# Patient Record
Sex: FEMALE | Race: WHITE | NOT HISPANIC OR LATINO | Employment: FULL TIME | URBAN - METROPOLITAN AREA
[De-identification: names, ages, dates, MRNs, and addresses within clinical notes are randomized per-mention and may not be internally consistent; named-entity substitution may affect disease eponyms.]

---
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
## 2018-05-14 ENCOUNTER — OFFICE VISIT (OUTPATIENT)
Dept: FAMILY MEDICINE | Facility: CLINIC | Age: 18
End: 2018-05-14
Payer: COMMERCIAL

## 2018-05-14 VITALS
WEIGHT: 181 LBS | BODY MASS INDEX: 25.97 KG/M2 | TEMPERATURE: 97.7 F | RESPIRATION RATE: 12 BRPM | HEART RATE: 65 BPM | OXYGEN SATURATION: 100 % | DIASTOLIC BLOOD PRESSURE: 78 MMHG | SYSTOLIC BLOOD PRESSURE: 124 MMHG

## 2018-05-14 DIAGNOSIS — Z30.9 ENCOUNTER FOR CONTRACEPTIVE MANAGEMENT, UNSPECIFIED TYPE: Primary | ICD-10-CM

## 2018-05-14 DIAGNOSIS — N92.0 EXCESSIVE OR FREQUENT MENSTRUATION: ICD-10-CM

## 2018-05-14 PROCEDURE — 99213 OFFICE O/P EST LOW 20 MIN: CPT | Performed by: FAMILY MEDICINE

## 2018-05-14 RX ORDER — DESOGESTREL AND ETHINYL ESTRADIOL 0.15-0.03
1 KIT ORAL DAILY
Qty: 84 TABLET | Refills: 0 | Status: SHIPPED | OUTPATIENT
Start: 2018-05-14 | End: 2018-08-13 | Stop reason: ALTCHOICE

## 2018-05-14 RX ORDER — DROSPIRENONE AND ETHINYL ESTRADIOL 0.02-3(28)
1 KIT ORAL DAILY
COMMUNITY
End: 2018-05-14 | Stop reason: ALTCHOICE

## 2018-05-14 NOTE — PROGRESS NOTES
SUBJECTIVE:   Tayler Andrew is a 17 year old female who presents to clinic today for the following health issues:      Contraception-wants to switch to something different, heavy menses and cramping     Patient here today to discuss current birth control.  Was started on Antionette in August 2017 and didn't notice any difference with flow of periods. States she went on this because of her heavy periods.   LMP- 5/11/18. Uses super pluses through out the period and has to change after about an hour and half.   Periods usually last about 4-5 days with some cramping but not severe.  Almost done with current pack of Antionette- on placebo.             Problem list and histories reviewed & adjusted, as indicated.  Additional history: as documented    Patient Active Problem List   Diagnosis     Excessive or frequent menstruation     History reviewed. No pertinent surgical history.    Social History   Substance Use Topics     Smoking status: Never Smoker     Smokeless tobacco: Never Used     Alcohol use No     History reviewed. No pertinent family history.        Reviewed and updated as needed this visit by clinical staff  Tobacco  Allergies  Meds  Med Hx  Surg Hx  Fam Hx  Soc Hx      Reviewed and updated as needed this visit by Provider         ROS:  Constitutional, HEENT, cardiovascular, pulmonary, GI, , musculoskeletal, neuro, skin, endocrine and psych systems are negative, except as otherwise noted.    OBJECTIVE:     /78 (BP Location: Right arm, Patient Position: Chair, Cuff Size: Adult Regular)  Pulse 65  Temp 97.7  F (36.5  C) (Oral)  Resp 12  Wt 181 lb (82.1 kg)  SpO2 100%  BMI 25.97 kg/m2  Body mass index is 25.97 kg/(m^2).  GENERAL: healthy, alert and no distress  RESP: lungs clear to auscultation - no rales, rhonchi or wheezes  CV: regular rate and rhythm, normal S1 S2  ABDOMEN: soft, nontender, no hepatosplenomegaly, no masses and bowel sounds normal    Diagnostic Test Results:  none      ASSESSMENT/PLAN:         1. Encounter for contraceptive management, unspecified type  - discussed other options. She wants to stick with OCP for now. will switch from XANDER to Apri and have patient follow up in 3 months prior to returning to school   - desogestrel-ethinyl estradiol (APRI) 0.15-30 MG-MCG per tablet; Take 1 tablet by mouth daily  Dispense: 84 tablet; Refill: 0    2. Excessive or frequent menstruation  - as above   - desogestrel-ethinyl estradiol (APRI) 0.15-30 MG-MCG per tablet; Take 1 tablet by mouth daily  Dispense: 84 tablet; Refill: 0    See Patient Instructions    Sherry Wilburn MD  [facility]

## 2018-05-14 NOTE — MR AVS SNAPSHOT
"              After Visit Summary   5/14/2018    Tayler Andrew    MRN: [id number]           Patient Information     Date Of Birth          2000        Visit Information        Provider Department      5/14/2018 10:30 AM Sherry Wilburn MD [facility]        Today's Diagnoses     Encounter for contraceptive management, unspecified type    -  1      Care Instructions    Follow up in 3 months or sooner if needed          Follow-ups after your visit        Follow-up notes from your care team     Return in about 3 months (around 8/14/2018).      Who to contact     If you have questions or need follow up information about today's clinic visit or your schedule please contact [facility] directly at [phone].  Normal or non-critical lab and imaging results will be communicated to you by MyChart, letter or phone within 4 business days after the clinic has received the results. If you do not hear from us within 7 days, please contact the clinic through MyChart or phone. If you have a critical or abnormal lab result, we will notify you by phone as soon as possible.  Submit refill requests through [employer] or call your pharmacy and they will forward the refill request to us. Please allow 3 business days for your refill to be completed.          Additional Information About Your Visit        MyCharTradeBriefs Information     [employer] lets you send messages to your doctor, view your test results, renew your prescriptions, schedule appointments and more. To sign up, go to [email or web address] . Click on \"Log in\" on the left side of the screen, which will take you to the Welcome page. Then click on \"Sign up Now\" on the right side of the page.     You will be asked to enter the access code listed below, as well as some personal information. Please follow the directions to create your username and password.     Your access code is: [email or web address]  Expires: 8/12/2018 10:52 AM   "   Your access code will  in 90 days. If you need help or a new code, please call your [facility] or [phone].        Care EveryWhere ID     This is your Care EveryWhere ID. This could be used by other organizations to access your [facility] medical records  [id number]        Your Vitals Were     Pulse Temperature Respirations Pulse Oximetry BMI (Body Mass Index)       65 97.7  F (36.5  C) (Oral) 12 100% 25.97 kg/m2        Blood Pressure from Last 3 Encounters:   18 124/78   10/11/17 110/66   17 97/64    Weight from Last 3 Encounters:   18 181 lb (82.1 kg) (95 %)*   10/11/17 177 lb (80.3 kg) (94 %)*   17 166 lb (75.3 kg) (92 %)*     * Growth percentiles are based on [facility] 2-20 Years data.              Today, you had the following     No orders found for display         Today's Medication Changes          These changes are accurate as of 18 10:53 AM.  If you have any questions, ask your nurse or doctor.               Start taking these medicines.        Dose/Directions    desogestrel-ethinyl estradiol 0.15-30 MG-MCG per tablet   Commonly known as:  APRI   Used for:  Encounter for contraceptive management, unspecified type   Started by:  Sherry Wilburn MD        Dose:  1 tablet   Take 1 tablet by mouth daily   Quantity:  84 tablet   Refills:  0            Where to get your medicines      These medications were sent to [facility]/pharmacy #[id number] - [place], [place] - [address] [address]  [address] [address] [address]    Hours:  [facility] Phone:  [phone]     desogestrel-ethinyl estradiol 0.15-30 MG-MCG per tablet                Primary Care Provider Office Phone # Fax #    Suzi Franks [phone]       [facility] [address]  [facility] [address]        Equal Access to Services     DAE ALEMAN AH: Jennyfer Cervantes, waaxda luqadaha, qaybta kaalmada mar boogie  ah. So [facility] [phone].    ATENCIÓN: Si adam rosado, tiene a gaming disposición servicios gratuitos de asistencia lingüística. Gilda al [phone].    We comply with applicable federal civil rights laws and [place] laws. We do not discriminate on the basis of race, color, national origin, age, disability, sex, sexual orientation, or gender identity.            Thank you!     Thank you for choosing [facility]  for your care. Our goal is always to provide you with excellent care. Hearing back from our patients is one way we can continue to improve our services. Please take a few minutes to complete the written survey that you may receive in the mail after your visit with us. Thank you!             Your Updated Medication List - Protect others around you: Learn how to safely use, store and throw away your medicines at www.disposemymeds.org.          This list is accurate as of 5/14/18 10:53 AM.  Always use your most recent med list.                   Brand Name Dispense Instructions for use Diagnosis    desogestrel-ethinyl estradiol 0.15-30 MG-MCG per tablet    APRI    84 tablet    Take 1 tablet by mouth daily    Encounter for contraceptive management, unspecified type       drospirenone-ethinyl estradiol 3-0.02 MG per tablet    XANDER     Take 1 tablet by mouth daily

## 2018-07-20 ENCOUNTER — TELEPHONE (OUTPATIENT)
Dept: FAMILY MEDICINE | Facility: CLINIC | Age: 18
End: 2018-07-20

## 2018-07-20 NOTE — TELEPHONE ENCOUNTER
Called Patient LMOVM, advised she make an appointment with Dr. Leyla Leal for medication follow-up per her last office visit note. Patient is due for office visit August 14, 2018. She can make an appointment sooner than August 14th, 2018.     Rosemarie Herrera RN  Patient Care Supervisor  [facility]  [phone]

## 2018-07-20 NOTE — TELEPHONE ENCOUNTER
Patient states the desogestrel-ethinyl estradiol (APRI) 0.15-30 MG-MCG  Is not helping her.  Continues to have heavy periods.  Would like to consider changing medication.  Please call to discuss.

## 2018-08-13 ENCOUNTER — OFFICE VISIT (OUTPATIENT)
Dept: FAMILY MEDICINE | Facility: CLINIC | Age: 18
End: 2018-08-13
Payer: COMMERCIAL

## 2018-08-13 VITALS
TEMPERATURE: 98.2 F | BODY MASS INDEX: 26.46 KG/M2 | RESPIRATION RATE: 16 BRPM | SYSTOLIC BLOOD PRESSURE: 109 MMHG | HEART RATE: 58 BPM | HEIGHT: 71 IN | DIASTOLIC BLOOD PRESSURE: 74 MMHG | WEIGHT: 189 LBS

## 2018-08-13 DIAGNOSIS — Z00.00 ROUTINE GENERAL MEDICAL EXAMINATION AT A HEALTH CARE FACILITY: Primary | ICD-10-CM

## 2018-08-13 DIAGNOSIS — N92.0 EXCESSIVE OR FREQUENT MENSTRUATION: ICD-10-CM

## 2018-08-13 DIAGNOSIS — Z30.41 ENCOUNTER FOR SURVEILLANCE OF CONTRACEPTIVE PILLS: ICD-10-CM

## 2018-08-13 LAB
ERYTHROCYTE [DISTWIDTH] IN BLOOD BY AUTOMATED COUNT: 13.9 % (ref 10–15)
HCT VFR BLD AUTO: 42 % (ref 35–47)
HGB BLD-MCNC: 13.3 G/DL (ref 11.7–15.7)
MCH RBC QN AUTO: 27.8 PG (ref 26.5–33)
MCHC RBC AUTO-ENTMCNC: 31.7 G/DL (ref 31.5–36.5)
MCV RBC AUTO: 88 FL (ref 78–100)
PLATELET # BLD AUTO: 255 10E9/L (ref 150–450)
RBC # BLD AUTO: 4.79 10E12/L (ref 3.8–5.2)
TSH SERPL DL<=0.005 MIU/L-ACNC: 0.71 MU/L (ref 0.4–4)
WBC # BLD AUTO: 5.2 10E9/L (ref 4–11)

## 2018-08-13 PROCEDURE — 85610 PROTHROMBIN TIME: CPT | Performed by: FAMILY MEDICINE

## 2018-08-13 PROCEDURE — 99395 PREV VISIT EST AGE 18-39: CPT | Mod: 25 | Performed by: FAMILY MEDICINE

## 2018-08-13 PROCEDURE — 90620 MENB-4C VACCINE IM: CPT | Performed by: FAMILY MEDICINE

## 2018-08-13 PROCEDURE — 85027 COMPLETE CBC AUTOMATED: CPT | Performed by: FAMILY MEDICINE

## 2018-08-13 PROCEDURE — 99213 OFFICE O/P EST LOW 20 MIN: CPT | Mod: 25 | Performed by: FAMILY MEDICINE

## 2018-08-13 PROCEDURE — 84443 ASSAY THYROID STIM HORMONE: CPT | Performed by: FAMILY MEDICINE

## 2018-08-13 PROCEDURE — 90471 IMMUNIZATION ADMIN: CPT | Performed by: FAMILY MEDICINE

## 2018-08-13 PROCEDURE — 90472 IMMUNIZATION ADMIN EACH ADD: CPT | Performed by: FAMILY MEDICINE

## 2018-08-13 PROCEDURE — 90651 9VHPV VACCINE 2/3 DOSE IM: CPT | Performed by: FAMILY MEDICINE

## 2018-08-13 PROCEDURE — 36415 COLL VENOUS BLD VENIPUNCTURE: CPT | Performed by: FAMILY MEDICINE

## 2018-08-13 PROCEDURE — 87491 CHLMYD TRACH DNA AMP PROBE: CPT | Performed by: FAMILY MEDICINE

## 2018-08-13 RX ORDER — LEVONORGESTREL AND ETHINYL ESTRADIOL 0.15-0.03
1 KIT ORAL DAILY
Qty: 91 TABLET | Refills: 1 | Status: SHIPPED | OUTPATIENT
Start: 2018-08-13 | End: 2018-10-17

## 2018-08-13 NOTE — LETTER
August 15, 2018      Tayler Andrew  [address] [address] [address]        Dear ,    We are writing to inform you of your test results.    All your labs from recent visit are normal.   No real reason for the increased sweating. Likely due to hormones.   Please follow up as discussed.   Good luck with the upcoming school year.     Dr. Palemr     Resulted Orders   Chlamydia trachomatis PCR   Result Value Ref Range    Specimen Description Urine     Chlamydia Trachomatis PCR Negative NEG^Negative      Comment:      Negative for C. trachomatis rRNA by transcription mediated amplification.  A negative result by transcription mediated amplification does not preclude   the presence of C. trachomatis infection because results are dependent on   proper and adequate collection, absence of inhibitors, and sufficient rRNA to   be detected.     TSH with free T4 reflex   Result Value Ref Range    TSH 0.71 0.40 - 4.00 mU/L   CBC with platelets   Result Value Ref Range    WBC 5.2 4.0 - 11.0 10e9/L    RBC Count 4.79 3.8 - 5.2 10e12/L    Hemoglobin 13.3 11.7 - 15.7 g/dL    Hematocrit 42.0 35.0 - 47.0 %    MCV 88 78 - 100 fl    MCH 27.8 26.5 - 33.0 pg    MCHC 31.7 31.5 - 36.5 g/dL    RDW 13.9 10.0 - 15.0 %    Platelet Count 255 150 - 450 10e9/L   INR   Result Value Ref Range    INR 0.93 0.86 - 1.14       If you have any questions or concerns, please call the clinic at the number listed above.       Sincerely,        Sherry Palmer MD/FANNY

## 2018-08-13 NOTE — PROGRESS NOTES
SUBJECTIVE:   CC: Tayler Andrew is an 17 year old woman who presents for preventive health visit.     Physical   Annual:     Getting at least 3 servings of Calcium per day:  Yes    Bi-annual eye exam:  NO    Dental care twice a year:  Yes    Sleep apnea or symptoms of sleep apnea:  None    Diet:  Regular (no restrictions)    Frequency of exercise:  4-5 days/week    Duration of exercise:  45-60 minutes    Taking medications regularly:  Yes    Medication side effects:  Not applicable    Additional concerns today:  No  Contraception       Per patient bleeding has been worse than when on XANDER. Cramping is better but no the bleeding.   LMP- 7/16/18. Usually lasts 4-5 days. Felt like had to change pads every 45 mins to an hour. Also reports more sweating than usual.     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         Today's PHQ-2 Score:   PHQ-2 ( 1999 [employer]) 8/13/2018   Q1: Little interest or pleasure in doing things 0   Q2: Feeling down, depressed or hopeless 0   PHQ-2 Score 0   Q1: Little interest or pleasure in doing things Not at all   Q2: Feeling down, depressed or hopeless Not at all   PHQ-2 Score 0       Abuse: Current or Past(Physical, Sexual or Emotional)- No  Do you feel safe in your environment - Yes    Social History   Substance Use Topics     Smoking status: Never Smoker     Smokeless tobacco: Never Used     Alcohol use No     Alcohol Use 8/13/2018   If you drink alcohol do you typically have greater than 3 drinks per day OR greater than 7 drinks per week? No   No flowsheet data found.    Reviewed orders with patient.  Reviewed health maintenance and updated orders accordingly - Yes  Labs reviewed in EPIC    Mammogram not appropriate for this patient based on age.    Pertinent mammograms are reviewed under the imaging tab.  History of abnormal Pap smear: NO - under age 21, PAP not appropriate for age     Reviewed and updated as needed this visit by clinical  "staff  Tobacco  Allergies  Meds         Reviewed and updated as needed this visit by Provider            Review of Systems  CONSTITUTIONAL: NEGATIVE for fever, chills, change in weight  INTEGUMENTARU/SKIN: NEGATIVE for worrisome rashes, moles or lesions  EYES: NEGATIVE for vision changes or irritation  ENT: NEGATIVE for ear, mouth and throat problems  RESP: NEGATIVE for significant cough or SOB  BREAST: NEGATIVE for masses, tenderness or discharge  CV: NEGATIVE for chest pain, palpitations or peripheral edema  GI: NEGATIVE for nausea, abdominal pain, heartburn, or change in bowel habits   female: menses: menorrhagia   MUSCULOSKELETAL: NEGATIVE for significant arthralgias or myalgia  NEURO: NEGATIVE for weakness, dizziness or paresthesias  ENDOCRINE: increase sweating   PSYCHIATRIC: NEGATIVE for changes in mood or affect     OBJECTIVE:   /74 (BP Location: Left arm, Patient Position: Chair, Cuff Size: Adult Large)  Pulse 58  Temp 98.2  F (36.8  C) (Oral)  Resp 16  Ht 5' 11\" (1.803 m)  Wt 189 lb (85.7 kg)  LMP 07/16/2018  Breastfeeding? No  BMI 26.36 kg/m2  Physical Exam  GENERAL: healthy, alert and no distress  EYES: Eyes grossly normal to inspection, PERRL and conjunctivae and sclerae normal  HENT: ear canals and TM's normal, nose and mouth without ulcers or lesions  NECK: no adenopathy, no asymmetry, masses, or scars and thyroid normal to palpation  RESP: lungs clear to auscultation - no rales, rhonchi or wheezes  CV: regular rate and rhythm, normal S1 S2,  ABDOMEN: soft, nontender,  and bowel sounds normal  MS: no gross musculoskeletal defects noted, no edema  SKIN: no suspicious lesions or rashes  NEURO: Normal strength and tone, mentation intact and speech normal  PSYCH: mentation appears normal, affect normal/bright    Diagnostic Test Results:  none     ASSESSMENT/PLAN:   1. Routine general medical examination at a health care facility  - TSH with free T4 reflex  - CBC with platelets  - " "MENINGOCOCCAL RP W/OMV VACCINE 2 DOSE IM (BEXSERO )  - HC HPV VAC 9V 3 DOSE IM    2. Excessive or frequent menstruation  - options discussed with the way forward including different OCP vs depo vs IUD. We will try on seasonal for now. Patient will follow up in 3 months or sooner if needed   - levonorgestrel-ethinyl estradiol (SEASONALE) 0.15-0.03 MG per tablet; Take 1 tablet by mouth daily  Dispense: 91 tablet; Refill: 1  - TSH with free T4 reflex  - CBC with platelets  - INR    3. Encounter for surveillance of contraceptive pills  - Chlamydia trachomatis PCR    COUNSELING:  Reviewed preventive health counseling, as reflected in patient instructions       Regular exercise       Healthy diet/nutrition       Safe sex practices/STD prevention    BP Readings from Last 1 Encounters:   08/13/18 109/74     Estimated body mass index is 26.36 kg/(m^2) as calculated from the following:    Height as of this encounter: 5' 11\" (1.803 m).    Weight as of this encounter: 189 lb (85.7 kg).      Weight management plan: Discussed healthy diet and exercise guidelines and patient will follow up in 12 months in clinic to re-evaluate.     reports that she has never smoked. She has never used smokeless tobacco.      Counseling Resources:  ATP IV Guidelines  Pooled Cohorts Equation Calculator  Breast Cancer Risk Calculator  FRAX Risk Assessment  ICSI Preventive Guidelines  Dietary Guidelines for Americans, 2010  USDA's MyPlate  ASA Prophylaxis  Lung CA Screening    Sherry Wilburn MD  [facility]  Answers for HPI/ROS submitted by the patient on 8/13/2018   PHQ-2 Score: 0    "

## 2018-08-13 NOTE — MR AVS SNAPSHOT
After Visit Summary   8/13/2018    Tayler Andrew    MRN: [id number]           Patient Information     Date Of Birth          2000        Visit Information        Provider Department      8/13/2018 10:30 AM Sherry Wilburn MD [facility]        Today's Diagnoses     Routine general medical examination at a health care facility    -  1    Excessive or frequent menstruation        Encounter for surveillance of contraceptive pills          Care Instructions      Follow up in 3 months or sooner if needed   Prevention Guidelines, Women Ages 18 to 39  Screening tests and vaccines are an important part of managing your health. A screening test is done to find possible disorders or diseases in people who don't have any symptoms. The goal is to find a disease early so lifestyle changes can be made and you can be watched more closely to reduce the risk of disease, or to detect it early enough to treat it most effectively. Screening tests are not considered diagnostic, but are used to determine if more testing is needed. Health counseling is essential, too. Below are guidelines for these, for women ages 18 to 39. Talk with your healthcare provider to make sure you re up-to-date on what you need.  Screening Who needs it How often   Alcohol misuse All women in this age group At routine exams   Blood pressure All women in this age group Yearly checkup if your blood pressure is normal  Normal blood pressure is less than 120/80 mm Hg  If your blood pressure reading is higher than normal, follow the advice of your healthcare provider   Breast cancer All women in this age group should talk with their healthcare providers about the need for clinical breast exams (CBE)1 Clinical breast exam every 3 years1   Cervical cancer Women ages 21 and older Women between ages 21 and 29 should have a Pap test every 3 years; women between ages 30 and 65 are advised to have a Pap test plus an  HPV test every 5 years   Chlamydia Sexually active women ages 24 and younger, and women at increased risk for infection Every 3 years if you're at risk or have symptoms   Depression All women in this age group At routine exams   Type 2 diabates, prediabetes All women with no symptoms who are overweight or obese and have 1 or more other risk factors for diabetes At least every 3 years. Also, testing for diabetes during pregnancy after the 24th week.    Type 2 diabetes, prediabetes All women diagnosed with gestational diabetes Lifelong testing every 3 years   Type 2 diabetes All women with prediabetes Every year   Gonorrhea Sexually active women at increased risk for infection At routine exams   Hepatitis C Anyone at increased risk At routine exams   HIV All women should be tested at least once for HIV between the ages of 13 and 64 At routine exams. Those with risk factors for HIV should be tested at least annually.    Obesity All women in this age group At routine exams   Syphilis Women at increased risk for infection should talk with their healthcare provider At routine exams   Tuberculosis Women at increased risk for infection should talk with their healthcare provider Ask your healthcare provider   Vision All women in this age group At least 1 complete exam in your 20s, and 2 in your 30s   Vaccine2 Who needs it How often   Chickenpox (varicella) All women in this age group who have no record of this infection or vaccine 2 doses; the second dose should be given 4 to 8 weeks after the first dose   Hepatitis A Women at increased risk for infection should talk with their healthcare provider 2 doses given at least 6 months apart   Hepatitis B Women at increased risk for infection should talk with their healthcare provider 3 doses over 6 months; second dose should be given 1 month after the first dose; the third dose should be given at least 2 months after the second dose and at least 4 months after the first dose    Haemophilus influenzae Type B (HIB) Women at increased risk for infection should talk with their healthcare provider 1 to 3 doses   Human papillomavirus (HPV) All women in this age group up to age 26 3 doses; the second dose should be given 1 to 2 months after the first dose and the third dose given 6 months after the first dose   Influenza (flu) All women in this age group Once a year   Measles, mumps, rubella (MMR) All women in this age group who have no record of these infections or vaccines 1 or 2 doses   Meningococcal Women at increased risk for infection should talk with their healthcare provider 1 or more doses   Pneumococcal conjugate vaccine (PCV13) and pneumococcal polysaccharide vaccine (PPSV23) Women at increased risk for infection should talk with their healthcare provider PCV13: 1 dose ages 19 to 65 (protects against 13 types of pneumococcal bacteria)  PPSV23: 1 to 2 doses through age 64, or 1 dose at 65 or older (protects against 23 types of pneumococcal bacteria)      Tetanus/diphtheria/pertussis (Td/Tdap) booster All women in this age group Td every 10 years, or a one-time dose of Tdap instead of a Td booster after age 18, then Td every 10 years   Counseling Who needs it How often   BRCA gene mutation testing for breast and ovarian cancer susceptibility Women with increased risk for having gene mutation When your risk is known   Breast cancer and chemoprevention Women at high risk for breast cancer When your risk is known   Diet and exercise Women who are overweight or obese When diagnosed, and then at routine exams   Domestic violence Women at the age in which they are able to have children At routine exams   Sexually transmitted infection prevention Women who are sexually active At routine exams   Skin cancer Prevention of skin cancer in fair-skinned adults At routine exams   Use of tobacco and the health effects it can cause All women in this age group Every visit   1According to the ACS, women  "ages 20 to 39 years should have a clinical breast exam (CBE) as part of their routine health exam every 3 years. Breast self-exams are an option for women starting in their 20s. But the USPSTF does not recommend CBE.  2Those who are 18 years old and not up-to-date on their childhood vaccines should get all appropriate catch-up vaccines recommended by the CDC.  Date Last Reviewed: 10/1/2017    6505-4887 [employer]. [address] [place] [address]. All rights reserved. This information is not intended as a substitute for professional medical care. Always follow your healthcare professional's instructions.                Follow-ups after your visit        Follow-up notes from your care team     Return in about 3 months (around 11/13/2018) for medication recheck.      Who to contact     If you have questions or need follow up information about today's clinic visit or your schedule please contact [facility] directly at [phone].  Normal or non-critical lab and imaging results will be communicated to you by [employer]outh[employer], letter or phone within 4 business days after the clinic has received the results. If you do not hear from us within 7 days, please contact the clinic through bettermarkst or phone. If you have a critical or abnormal lab result, we will notify you by phone as soon as possible.  Submit refill requests through [employer] or call your pharmacy and they will forward the refill request to us. Please allow 3 business days for your refill to be completed.          Additional Information About Your Visit        [employer] Information     [employer] lets you send messages to your doctor, view your test results, renew your prescriptions, schedule appointments and more. To sign up, go to [email or web address] . Click on \"Log in\" on the left side of the screen, which will take you to the Welcome page. Then click on \"Sign up Now\" on the right side of the page.     You will be asked to " "enter the access code listed below, as well as some personal information. Please follow the directions to create your username and password.     Your access code is: [email or web address]  Expires: 2018 11:16 AM     Your access code will  in 90 days. If you need help or a new code, please call your [facility] clinic or [phone].        Care EveryWhere ID     This is your Care EveryWhere ID. This could be used by other organizations to access your [facility] medical records  [id number]        Your Vitals Were     Pulse Temperature Respirations Height Last Period Breastfeeding?    58 98.2  F (36.8  C) (Oral) 16 5' 11\" (1.803 m) 2018 No    BMI (Body Mass Index)                   26.36 kg/m2            Blood Pressure from Last 3 Encounters:   18 109/74   18 124/78   10/11/17 110/66    Weight from Last 3 Encounters:   18 189 lb (85.7 kg) (96 %)*   18 181 lb (82.1 kg) (95 %)*   10/11/17 177 lb (80.3 kg) (94 %)*     * Growth percentiles are based on CDC 2-20 Years data.              We Performed the Following     Chlamydia trachomatis PCR          Today's Medication Changes          These changes are accurate as of 18 11:16 AM.  If you have any questions, ask your nurse or doctor.               Start taking these medicines.        Dose/Directions    levonorgestrel-ethinyl estradiol 0.15-0.03 MG per tablet   Commonly known as:  SEASONALE   Used for:  Excessive or frequent menstruation   Started by:  Sherry Wilburn MD        Dose:  1 tablet   Take 1 tablet by mouth daily   Quantity:  91 tablet   Refills:  1         Stop taking these medicines if you haven't already. Please contact your care team if you have questions.     desogestrel-ethinyl estradiol 0.15-30 MG-MCG per tablet   Commonly known as:  APRI   Stopped by:  Sherry Wilburn MD                Where to get your medicines      These medications were sent to [facility]/pharmacy #[id number] [facility] [id number] " [facility]  [address] [facility] [address]    Hours:  [facility] Phone:  [phone]     levonorgestrel-ethinyl estradiol 0.15-0.03 MG per tablet                Primary Care Provider Office Phone # Fax #    Sherry Wilburn [phone] [address]  [facility] [address]        Equal Access to Services     [facility]: Hadii aad ku hadasho Soomaali, waaxda luqadaha, qaybta kaalmada adeegyada, waxay idiin hayaan adeeg kharash la'linda . So [facility] [phone].    ATENCIÓN: Si habla español, tiene a gaming disposición servicios gratuitos de asistencia lingüística. [facility] [phone].    We comply with applicable federal civil rights laws and [place] laws. We do not discriminate on the basis of race, color, national origin, age, disability, sex, sexual orientation, or gender identity.            Thank you!     Thank you for choosing [facility]  for your care. Our goal is always to provide you with excellent care. Hearing back from our patients is one way we can continue to improve our services. Please take a few minutes to complete the written survey that you may receive in the mail after your visit with us. Thank you!             Your Updated Medication List - Protect others around you: Learn how to safely use, store and throw away your medicines at www.disposemymeds.org.          This list is accurate as of 8/13/18 11:16 AM.  Always use your most recent med list.                   Brand Name Dispense Instructions for use Diagnosis    levonorgestrel-ethinyl estradiol 0.15-0.03 MG per tablet    SEASONALE    91 tablet    Take 1 tablet by mouth daily    Excessive or frequent menstruation

## 2018-08-13 NOTE — PATIENT INSTRUCTIONS
Follow up in 3 months or sooner if needed   Prevention Guidelines, Women Ages 18 to 39  Screening tests and vaccines are an important part of managing your health. A screening test is done to find possible disorders or diseases in people who don't have any symptoms. The goal is to find a disease early so lifestyle changes can be made and you can be watched more closely to reduce the risk of disease, or to detect it early enough to treat it most effectively. Screening tests are not considered diagnostic, but are used to determine if more testing is needed. Health counseling is essential, too. Below are guidelines for these, for women ages 18 to 39. Talk with your healthcare provider to make sure you re up-to-date on what you need.  Screening Who needs it How often   Alcohol misuse All women in this age group At routine exams   Blood pressure All women in this age group Yearly checkup if your blood pressure is normal  Normal blood pressure is less than 120/80 mm Hg  If your blood pressure reading is higher than normal, follow the advice of your healthcare provider   Breast cancer All women in this age group should talk with their healthcare providers about the need for clinical breast exams (CBE)1 Clinical breast exam every 3 years1   Cervical cancer Women ages 21 and older Women between ages 21 and 29 should have a Pap test every 3 years; women between ages 30 and 65 are advised to have a Pap test plus an HPV test every 5 years   Chlamydia Sexually active women ages 24 and younger, and women at increased risk for infection Every 3 years if you're at risk or have symptoms   Depression All women in this age group At routine exams   Type 2 diabates, prediabetes All women with no symptoms who are overweight or obese and have 1 or more other risk factors for diabetes At least every 3 years. Also, testing for diabetes during pregnancy after the 24th week.    Type 2 diabetes, prediabetes All women diagnosed with  gestational diabetes Lifelong testing every 3 years   Type 2 diabetes All women with prediabetes Every year   Gonorrhea Sexually active women at increased risk for infection At routine exams   Hepatitis C Anyone at increased risk At routine exams   HIV All women should be tested at least once for HIV between the ages of 13 and 64 At routine exams. Those with risk factors for HIV should be tested at least annually.    Obesity All women in this age group At routine exams   Syphilis Women at increased risk for infection should talk with their healthcare provider At routine exams   Tuberculosis Women at increased risk for infection should talk with their healthcare provider Ask your healthcare provider   Vision All women in this age group At least 1 complete exam in your 20s, and 2 in your 30s   Vaccine2 Who needs it How often   Chickenpox (varicella) All women in this age group who have no record of this infection or vaccine 2 doses; the second dose should be given 4 to 8 weeks after the first dose   Hepatitis A Women at increased risk for infection should talk with their healthcare provider 2 doses given at least 6 months apart   Hepatitis B Women at increased risk for infection should talk with their healthcare provider 3 doses over 6 months; second dose should be given 1 month after the first dose; the third dose should be given at least 2 months after the second dose and at least 4 months after the first dose   Haemophilus influenzae Type B (HIB) Women at increased risk for infection should talk with their healthcare provider 1 to 3 doses   Human papillomavirus (HPV) All women in this age group up to age 26 3 doses; the second dose should be given 1 to 2 months after the first dose and the third dose given 6 months after the first dose   Influenza (flu) All women in this age group Once a year   Measles, mumps, rubella (MMR) All women in this age group who have no record of these infections or vaccines 1 or 2 doses    Meningococcal Women at increased risk for infection should talk with their healthcare provider 1 or more doses   Pneumococcal conjugate vaccine (PCV13) and pneumococcal polysaccharide vaccine (PPSV23) Women at increased risk for infection should talk with their healthcare provider PCV13: 1 dose ages 19 to 65 (protects against 13 types of pneumococcal bacteria)  PPSV23: 1 to 2 doses through age 64, or 1 dose at 65 or older (protects against 23 types of pneumococcal bacteria)      Tetanus/diphtheria/pertussis (Td/Tdap) booster All women in this age group Td every 10 years, or a one-time dose of Tdap instead of a Td booster after age 18, then Td every 10 years   Counseling Who needs it How often   BRCA gene mutation testing for breast and ovarian cancer susceptibility Women with increased risk for having gene mutation When your risk is known   Breast cancer and chemoprevention Women at high risk for breast cancer When your risk is known   Diet and exercise Women who are overweight or obese When diagnosed, and then at routine exams   Domestic violence Women at the age in which they are able to have children At routine exams   Sexually transmitted infection prevention Women who are sexually active At routine exams   Skin cancer Prevention of skin cancer in fair-skinned adults At routine exams   Use of tobacco and the health effects it can cause All women in this age group Every visit   1According to the ACS, women ages 20 to 39 years should have a clinical breast exam (CBE) as part of their routine health exam every 3 years. Breast self-exams are an option for women starting in their 20s. But the USPSTF does not recommend CBE.  2Those who are 18 years old and not up-to-date on their childhood vaccines should get all appropriate catch-up vaccines recommended by the CDC.  Date Last Reviewed: 10/1/2017    2207-5327 [employer]. [address] [place] [address], [place] [address]. All rights reserved. This information  is not intended as a substitute for professional medical care. Always follow your healthcare professional's instructions.

## 2018-08-14 LAB
C TRACH DNA SPEC QL NAA+PROBE: NEGATIVE
INR PPP: 0.93 (ref 0.86–1.14)
SPECIMEN SOURCE: NORMAL

## 2018-08-15 ENCOUNTER — TELEPHONE (OUTPATIENT)
Dept: FAMILY MEDICINE | Facility: CLINIC | Age: 18
End: 2018-08-15

## 2018-08-15 NOTE — TELEPHONE ENCOUNTER
Tayler calls, uncertain if she couldstart OCP now or wait until Sunday after next menstrual period. Hoping to avoid next period so would like to start ASAP.    Call back Tayler's  mobile [phone] - OK to leave detailed message  Zachariah Salgado RN

## 2018-08-15 NOTE — PROGRESS NOTES
All your labs from recent visit are normal.   No real reason for the increased sweating. Likely due to hormones.   Please follow up as discussed.   Good luck with the upcoming school year.     Dr. Palmer

## 2018-08-16 NOTE — TELEPHONE ENCOUNTER
Called mom's phone by accident as was # listed above.  Did not L/M.  Called Tayler at below # and L/M that she can start RX today.  Bozena Dahl RN

## 2018-09-16 ENCOUNTER — OFFICE VISIT (OUTPATIENT)
Dept: URGENT CARE | Facility: URGENT CARE | Age: 18
End: 2018-09-16
Payer: COMMERCIAL

## 2018-09-16 VITALS
SYSTOLIC BLOOD PRESSURE: 120 MMHG | OXYGEN SATURATION: 99 % | HEART RATE: 87 BPM | DIASTOLIC BLOOD PRESSURE: 86 MMHG | TEMPERATURE: 98.4 F

## 2018-09-16 DIAGNOSIS — R07.0 THROAT PAIN: Primary | ICD-10-CM

## 2018-09-16 LAB
DEPRECATED S PYO AG THROAT QL EIA: NORMAL
SPECIMEN SOURCE: NORMAL

## 2018-09-16 PROCEDURE — 87880 STREP A ASSAY W/OPTIC: CPT | Performed by: PHYSICIAN ASSISTANT

## 2018-09-16 PROCEDURE — 99213 OFFICE O/P EST LOW 20 MIN: CPT | Performed by: PHYSICIAN ASSISTANT

## 2018-09-16 PROCEDURE — 87081 CULTURE SCREEN ONLY: CPT | Performed by: PHYSICIAN ASSISTANT

## 2018-09-16 RX ORDER — LORATADINE 10 MG/1
10 TABLET ORAL DAILY
COMMUNITY

## 2018-09-16 RX ORDER — OFLOXACIN 3 MG/ML
1-2 SOLUTION/ DROPS OPHTHALMIC
COMMUNITY
End: 2019-06-26

## 2018-09-16 ASSESSMENT — ENCOUNTER SYMPTOMS
NAUSEA: 0
SORE THROAT: 1
COUGH: 0
RHINORRHEA: 1
SHORTNESS OF BREATH: 0
FEVER: 0
CHILLS: 0
VOMITING: 0
DIARRHEA: 0

## 2018-09-16 NOTE — PROGRESS NOTES
SUBJECTIVE:   Tayler Andrew is a 17 year old female presenting with a chief complaint of   Chief Complaint   Patient presents with     Urgent Care     Pharyngitis     Possible strep started yesterday- sore throat, nasal congestion, HA, bilateral ear pain       She is an established patient of [facility].    URI Adult    Onset of symptoms was 1 day(s) ago.  Course of illness is same.    Severity moderate  Current and Associated symptoms: runny nose, stuffy nose and sore throat  Treatment measures tried include Tylenol/Ibuprofen.  Predisposing factors include None.  Denies f/c/n/v/d.      Review of Systems   Constitutional: Negative for chills and fever.   HENT: Positive for congestion, ear pain, rhinorrhea and sore throat.    Respiratory: Negative for cough and shortness of breath.    Gastrointestinal: Negative for diarrhea, nausea and vomiting.       History reviewed. No pertinent past medical history.  History reviewed. No pertinent family history.  Current Outpatient Prescriptions   Medication Sig Dispense Refill     levonorgestrel-ethinyl estradiol (SEASONALE) 0.15-0.03 MG per tablet Take 1 tablet by mouth daily 91 tablet 1     loratadine (CLARITIN) 10 MG tablet Take 10 mg by mouth daily       ofloxacin (OCUFLOX) 0.3 % ophthalmic solution 1-2 drops every 2 hours (while awake)       Social History   Substance Use Topics     Smoking status: Never Smoker     Smokeless tobacco: Never Used     Alcohol use No       OBJECTIVE  /86 (BP Location: Right arm, Patient Position: Chair, Cuff Size: Adult Regular)  Pulse 87  Temp 98.4  F (36.9  C) (Oral)  SpO2 99%    Physical Exam   Constitutional: She appears well-developed and well-nourished. No distress.   HENT:   Head: Normocephalic and atraumatic.   Right Ear: Tympanic membrane and external ear normal.   Left Ear: Tympanic membrane and external ear normal.   Mouth/Throat: No oropharyngeal exudate.   Mild posterior pharynx erythema   Eyes: Conjunctivae and EOM  are normal.   Neck: Normal range of motion. Neck supple.   Cardiovascular: Regular rhythm and normal heart sounds.    Pulmonary/Chest: Effort normal and breath sounds normal. No respiratory distress.   Lymphadenopathy:     She has no cervical adenopathy.   Neurological: She is alert.   Skin: Skin is warm and dry.   Psychiatric: She has a normal mood and affect.       Labs:  Results for orders placed or performed in visit on 09/16/18 (from the past 24 hour(s))   Rapid strep screen   Result Value Ref Range    Specimen Description Throat     Rapid Strep A Screen       NEGATIVE: No Group A streptococcal antigen detected by immunoassay, await culture report.           ASSESSMENT:      ICD-10-CM    1. Throat pain R07.0 Rapid strep screen     Beta strep group A culture        Medical Decision Making:    Differential Diagnosis:  URI Adult/Peds:  Strep pharyngitis, Tonsilitis, Viral pharyngitis, Viral syndrome and Viral upper respiratory illness    Serious Comorbid Conditions:  Adult:  None    PLAN:    Acute pharyngitis: Rapid strep is negative today.  Throat culture is pending.  Supportive care measures advised.  We will communicate any positive finding on the throat culture result.  Follow-up if any worsening symptoms.  Patient understands and agrees with the plan.    Followup:    If not improving or if condition worsens, follow up with your Primary Care Provider

## 2018-09-16 NOTE — MR AVS SNAPSHOT
After Visit Summary   9/16/2018    Tayler Andrew    MRN: [id number]           Patient Information     Date Of Birth          2000        Visit Information        Provider Department      9/16/2018 11:05 AM Mary Jones PA-C [facility]        Today's Diagnoses     Throat pain    -  1       Follow-ups after your visit        Who to contact     If you have questions or need follow up information about today's clinic visit or your schedule please contact [facility] directly at [phone].  Normal or non-critical lab and imaging results will be communicated to you by Embedded Chathart, letter or phone within 4 business days after the clinic has received the results. If you do not hear from us within 7 days, please contact the clinic through NeGoBuYt or phone. If you have a critical or abnormal lab result, we will notify you by phone as soon as possible.  Submit refill requests through Interactive Supercomputing or call your pharmacy and they will forward the refill request to us. Please allow 3 business days for your refill to be completed.          Additional Information About Your Visit        MyChart Information     Inter[employer] gives you secure access to your electronic health record. If you see a primary care provider, you can also send messages to your care team and make appointments. If you have questions, please call your primary care clinic.  If you do not have a primary care provider, please call [phone] and they will assist you.        Care EveryWhere ID     This is your Care EveryWhere ID. This could be used by other organizations to access your [facility] medical records  [id number]        Your Vitals Were     Pulse Temperature Pulse Oximetry             87 98.4  F (36.9  C) (Oral) 99%          Blood Pressure from Last 3 Encounters:   09/16/18 120/86   08/13/18 109/74   05/14/18 124/78    Weight from Last 3 Encounters:   08/13/18 189 lb (85.7 kg) (96 %)*    05/14/18 181 lb (82.1 kg) (95 %)*   10/11/17 177 lb (80.3 kg) (94 %)*     * Growth percentiles are based on [facility] 2-20 Years data.              We Performed the Following     Beta strep group A culture     Rapid strep screen        Primary Care Provider Office Phone # Fax #    Sherry Gabi Wilburn [phone] [address] [facility] [address]        Equal Access to Services     [facility] LOVE : Hadii aad ku hadasho Soomaali, waaxda luqadaha, qaybta kaalmada adeegyada, waxay idiin hayaan adeeg gonzalo ruth . So [facility] [phone].    ATENCIÓN: Si habla español, tiene a gaming disposición servicios gratuitos de asistencia lingüística. [facility] [phone].    We comply with applicable federal civil rights laws and [place] laws. We do not discriminate on the basis of race, color, national origin, age, disability, sex, sexual orientation, or gender identity.            Thank you!     Thank you for choosing [facility]RG[facility]  for your care. Our goal is always to provide you with excellent care. Hearing back from our patients is one way we can continue to improve our services. Please take a few minutes to complete the written survey that you may receive in the mail after your visit with us. Thank you!             Your Updated Medication List - Protect others around you: Learn how to safely use, store and throw away your medicines at www.disposemymeds.org.          This list is accurate as of 9/16/18 11:49 AM.  Always use your most recent med list.                   Brand Name Dispense Instructions for use Diagnosis    levonorgestrel-ethinyl estradiol 0.15-0.03 MG per tablet    SEASONALE    91 tablet    Take 1 tablet by mouth daily    Excessive or frequent menstruation       loratadine 10 MG tablet    CLARITIN     Take 10 mg by mouth daily        ofloxacin 0.3 % ophthalmic solution    OCUFLOX     1-2 drops every 2 hours (while awake)

## 2018-09-17 LAB
BACTERIA SPEC CULT: NORMAL
SPECIMEN SOURCE: NORMAL

## 2018-10-17 DIAGNOSIS — N92.0 EXCESSIVE OR FREQUENT MENSTRUATION: ICD-10-CM

## 2018-10-17 RX ORDER — LEVONORGESTREL AND ETHINYL ESTRADIOL 0.15-0.03
1 KIT ORAL DAILY
Status: SHIPPED
Start: 2018-10-17 | End: 2019-01-17

## 2018-10-17 NOTE — TELEPHONE ENCOUNTER
Last Written Prescription Date:  08/13/18  Last Fill Quantity: 91,  # refills: 1   Last office visit: 8/13/2018 with prescribing provider:  Dr Leyla Gleason   Future Office Visit:        Requested Prescriptions   Pending Prescriptions Disp Refills     levonorgestrel-ethinyl estradiol (SEASONALE) 0.15-0.03 MG per tablet 91 tablet 1     Sig: Take 1 tablet by mouth daily    There is no refill protocol information for this order        Patient is at [facility], and seems to have misplaced her Rx for her BC - or the bottle, looking for a new Rx to be sent to her on campus pharmacy at [facility].     If any questions can call mom or patient for questions.     Karina Urban/GT

## 2019-01-17 DIAGNOSIS — N92.0 EXCESSIVE OR FREQUENT MENSTRUATION: ICD-10-CM

## 2019-01-17 NOTE — TELEPHONE ENCOUNTER
Requested Prescriptions   Pending Prescriptions Disp Refills     levonorgestrel-ethinyl estradiol (SEASONALE) 0.15-0.03 MG tablet       Sig: Take 1 tablet by mouth daily    There is no refill protocol information for this order        Patient is in [place] for school. She will be done with basketball the first week in March, if she needs to schedule an appointment.    Olya Soliman  FWF - TC/FD  1/17/2019 2:42 PM

## 2019-01-18 RX ORDER — LEVONORGESTREL AND ETHINYL ESTRADIOL 0.15-0.03
1 KIT ORAL DAILY
Qty: 84 TABLET | Refills: 0 | Status: SHIPPED | OUTPATIENT
Start: 2019-01-18 | End: 2019-06-26

## 2019-01-18 NOTE — TELEPHONE ENCOUNTER
"Was to [place] in 3 months.  Note states not back from school til March.  OK to refill?  Please advise.  Bozena Dahl RN    8/13/18  ASSESSMENT/PLAN:   1. Routine general medical examination at a health care facility  - TSH with free T4 reflex  - CBC with platelets  - MENINGOCOCCAL RP W/OMV VACCINE 2 DOSE IM (BEXSERO )  - HC HPV VAC 9V 3 DOSE IM     2. Excessive or frequent menstruation  - options discussed with the way forward including different OCP vs depo vs IUD. We will try on seasonal for now. Patient will follow up in 3 months or sooner if needed   - levonorgestrel-ethinyl estradiol (SEASONALE) 0.15-0.03 MG per tablet; Take 1 tablet by mouth daily  Dispense: 91 tablet; Refill: 1  - TSH with free T4 reflex  - CBC with platelets  - INR     3. Encounter for surveillance of contraceptive pills  - Chlamydia trachomatis PCR     COUNSELING:  Reviewed preventive health counseling, as reflected in patient instructions       Regular exercise       Healthy diet/nutrition       Safe sex practices/STD prevention         BP Readings from Last 1 Encounters:   08/13/18 109/74      Estimated body mass index is 26.36 kg/(m^2) as calculated from the following:    Height as of this encounter: 5' 11\" (1.803 m).    Weight as of this encounter: 189 lb (85.7 kg).        Weight management plan: Discussed healthy diet and exercise guidelines and patient will follow up in 12 months in clinic to re-evaluate.      reports that she has never smoked. She has never used smokeless tobacco.        Counseling Resources:  ATP IV Guidelines  Pooled Cohorts Equation Calculator  Breast Cancer Risk Calculator  FRAX Risk Assessment  ICSI Preventive Guidelines  Dietary Guidelines for Americans, 2010  streamit's MyPlate  ASA Prophylaxis  Lung CA Screening     Sherry Wilburn MD  [facility]  Answers for HPI/ROS submitted by the patient on 8/13/2018   PHQ-2 Score: 0         Instructions         Return in about 3 months (around " 11/13/2018) for medication recheck.

## 2019-06-26 ENCOUNTER — OFFICE VISIT (OUTPATIENT)
Dept: FAMILY MEDICINE | Facility: CLINIC | Age: 19
End: 2019-06-26
Payer: COMMERCIAL

## 2019-06-26 VITALS
BODY MASS INDEX: 27.58 KG/M2 | SYSTOLIC BLOOD PRESSURE: 98 MMHG | WEIGHT: 197 LBS | RESPIRATION RATE: 16 BRPM | DIASTOLIC BLOOD PRESSURE: 64 MMHG | OXYGEN SATURATION: 98 % | HEART RATE: 71 BPM | TEMPERATURE: 99 F | HEIGHT: 71 IN

## 2019-06-26 DIAGNOSIS — Z13.0 ENCOUNTER FOR SICKLE-CELL SCREENING: ICD-10-CM

## 2019-06-26 DIAGNOSIS — N92.0 EXCESSIVE OR FREQUENT MENSTRUATION: ICD-10-CM

## 2019-06-26 DIAGNOSIS — Z00.00 ROUTINE GENERAL MEDICAL EXAMINATION AT A HEALTH CARE FACILITY: Primary | ICD-10-CM

## 2019-06-26 PROCEDURE — 87491 CHLMYD TRACH DNA AMP PROBE: CPT | Performed by: NURSE PRACTITIONER

## 2019-06-26 PROCEDURE — 99395 PREV VISIT EST AGE 18-39: CPT | Performed by: NURSE PRACTITIONER

## 2019-06-26 PROCEDURE — 36415 COLL VENOUS BLD VENIPUNCTURE: CPT | Performed by: NURSE PRACTITIONER

## 2019-06-26 PROCEDURE — 83021 HEMOGLOBIN CHROMOTOGRAPHY: CPT | Mod: 90 | Performed by: NURSE PRACTITIONER

## 2019-06-26 PROCEDURE — 99000 SPECIMEN HANDLING OFFICE-LAB: CPT | Performed by: NURSE PRACTITIONER

## 2019-06-26 RX ORDER — LEVONORGESTREL AND ETHINYL ESTRADIOL 0.15-0.03
1 KIT ORAL DAILY
Qty: 84 TABLET | Refills: 3 | Status: SHIPPED | OUTPATIENT
Start: 2019-06-26 | End: 2020-06-16

## 2019-06-26 RX ORDER — FLUTICASONE PROPIONATE 50 MCG
1 SPRAY, SUSPENSION (ML) NASAL
COMMUNITY
Start: 2018-11-01 | End: 2020-07-08

## 2019-06-26 RX ORDER — AZELASTINE 1 MG/ML
SPRAY, METERED NASAL
Refills: 11 | COMMUNITY
Start: 2018-12-12 | End: 2020-07-08

## 2019-06-26 ASSESSMENT — ENCOUNTER SYMPTOMS
WEAKNESS: 0
HEMATURIA: 0
PALPITATIONS: 0
FREQUENCY: 0
COUGH: 0
CONSTIPATION: 0
MYALGIAS: 0
EYE PAIN: 0
NERVOUS/ANXIOUS: 0
FEVER: 0
DYSURIA: 0
HEMATOCHEZIA: 0
NAUSEA: 0
DIARRHEA: 0
DIZZINESS: 0
JOINT SWELLING: 0
ARTHRALGIAS: 1
ABDOMINAL PAIN: 0
PARESTHESIAS: 0
HEADACHES: 0
SORE THROAT: 0
SHORTNESS OF BREATH: 0
HEARTBURN: 0

## 2019-06-26 ASSESSMENT — MIFFLIN-ST. JEOR: SCORE: 1759.72

## 2019-06-26 NOTE — PROGRESS NOTES
SUBJECTIVE:   CC: Tayler Andrew is an 18 year old woman who presents for preventive health visit.     Healthy Habits:     Getting at least 3 servings of Calcium per day:  Yes    Bi-annual eye exam:  NO    Dental care twice a year:  Yes    Sleep apnea or symptoms of sleep apnea:  None    Diet:  Regular (no restrictions)    Frequency of exercise:  4-5 days/week    Duration of exercise:  30-45 minutes    Taking medications regularly:  Yes    Medication side effects:  None    PHQ-2 Total Score: 0    Additional concerns today:  No      Attending [employer] this fall for her leonardo year.  Will be playing basketball.  Needs forms completed today.      Needs refill on her birth control.  She is currently out of this.  She is not sexually active.  Initially started for heavy bleeding and pills have worked well to control the bleeding.  Prior tried two other pills that did not work well.   She has noticed more sweating when she takes Seasonale, but this isn't bothersome.      Vision R eye: 20/16     L eye: 20/16    Today's PHQ-2 Score:   PHQ-2 ( 1999 Pfizer) 6/26/2019   Q1: Little interest or pleasure in doing things 0   Q2: Feeling down, depressed or hopeless 0   PHQ-2 Score 0   Q1: Little interest or pleasure in doing things Not at all   Q2: Feeling down, depressed or hopeless Not at all   PHQ-2 Score 0       Abuse: Current or Past(Physical, Sexual or Emotional)- No  Do you feel safe in your environment? Yes    Social History     Tobacco Use     Smoking status: Never Smoker     Smokeless tobacco: Never Used   Substance Use Topics     Alcohol use: No     Alcohol/week: 0.0 oz         Alcohol Use 6/26/2019   Prescreen: >3 drinks/day or >7 drinks/week? Not Applicable   Prescreen: >3 drinks/day or >7 drinks/week? -       Reviewed orders with patient.  Reviewed health maintenance and updated orders accordingly - Yes  BP Readings from Last 3 Encounters:   06/26/19 98/64   09/16/18 120/86   08/13/18 109/74    Wt  Readings from Last 3 Encounters:   06/26/19 89.4 kg (197 lb)   08/13/18 85.7 kg (189 lb) (96 %)*   05/14/18 82.1 kg (181 lb) (95 %)*     * Growth percentiles are based on [facility] (Girls, 2-20 Years) data.                  Current Outpatient Medications   Medication Sig Dispense Refill     azelastine (ASTELIN) 0.1 % nasal spray U 1 SPR IEN BID  11     fluticasone (FLONASE) 50 MCG/ACT nasal spray Spray 1 spray in nostril       levonorgestrel-ethinyl estradiol (SEASONALE) 0.15-0.03 MG tablet Take 1 tablet by mouth daily 84 tablet 0     loratadine (CLARITIN) 10 MG tablet Take 10 mg by mouth daily       Allergies   Allergen Reactions     Nuts Anaphylaxis     Seasonal Allergies Itching       Mammogram not appropriate for this patient based on age.    Pertinent mammograms are reviewed under the imaging tab.  History of abnormal Pap smear: NO - under age 21, PAP not appropriate for age     Reviewed and updated as needed this visit by clinical staff  Tobacco  Allergies  Meds  Med Hx  Surg Hx  Fam Hx  Soc Hx        Reviewed and updated as needed this visit by Provider  Meds        History reviewed. No pertinent past medical history.   History reviewed. No pertinent surgical history.    Review of Systems   Constitutional: Negative for fever.   HENT: Positive for congestion. Negative for ear pain, hearing loss and sore throat.    Eyes: Negative for pain and visual disturbance.   Respiratory: Negative for cough and shortness of breath.    Cardiovascular: Negative for chest pain, palpitations and peripheral edema.   Gastrointestinal: Negative for abdominal pain, constipation, diarrhea, heartburn, hematochezia and nausea.   Genitourinary: Negative for dysuria, frequency, genital sores, hematuria and urgency.   Musculoskeletal: Positive for arthralgias. Negative for joint swelling and myalgias.   Skin: Positive for rash.   Neurological: Negative for dizziness, weakness, headaches and paresthesias.   Psychiatric/Behavioral:  "Negative for mood changes. The patient is not nervous/anxious.         OBJECTIVE:   BP 98/64 (BP Location: Right arm, Patient Position: Sitting, Cuff Size: Adult Regular)   Pulse 71   Temp 99  F (37.2  C) (Oral)   Resp 16   Ht 1.803 m (5' 11\")   Wt 89.4 kg (197 lb)   LMP 06/06/2019 (Approximate)   SpO2 98%   BMI 27.48 kg/m    Physical Exam  GENERAL: healthy, alert and no distress  EYES: Eyes grossly normal to inspection, PERRL and conjunctivae and sclerae normal  HENT: ear canals and TM's normal, nose and mouth without ulcers or lesions  NECK: no adenopathy, no asymmetry, masses, or scars and thyroid normal to palpation  RESP: lungs clear to auscultation - no rales, rhonchi or wheezes  CV: regular rate and rhythm, normal S1 S2, no S3 or S4, no murmur, click or rub, no peripheral edema and peripheral pulses strong  ABDOMEN: soft, nontender, no hepatosplenomegaly, no masses and bowel sounds normal  MS: no gross musculoskeletal defects noted, no edema  SKIN: no suspicious lesions or rashes  NEURO: Normal strength and tone, mentation intact, cranial nerves 2-12 intact and DTR's normal and symmetric   PSYCH: mentation appears normal, affect normal/bright    Diagnostic Test Results:  none     ASSESSMENT/PLAN:   1. Routine general medical examination at a health care facility  Normal exam today; forms completed for her.     2. Encounter for sickle-cell screening  Required for school physical.    - Hemoglobin S    3. Excessive or frequent menstruation  OCPs are working well.  Refilled.  Will be due for pap this fall.   - levonorgestrel-ethinyl estradiol (SEASONALE) 0.15-0.03 MG tablet; Take 1 tablet by mouth daily  Dispense: 84 tablet; Refill: 3  - Chlamydia trachomatis PCR    COUNSELING:  Reviewed preventive health counseling, as reflected in patient instructions       Regular exercise       Healthy diet/nutrition    Estimated body mass index is 27.48 kg/m  as calculated from the following:    Height as of this " "encounter: 1.803 m (5' 11\").    Weight as of this encounter: 89.4 kg (197 lb).    Weight management plan: Discussed healthy diet and exercise guidelines     reports that she has never smoked. She has never used smokeless tobacco.      Counseling Resources:  ATP IV Guidelines  Pooled Cohorts Equation Calculator  Breast Cancer Risk Calculator  FRAX Risk Assessment  ICSI Preventive Guidelines  Dietary Guidelines for Americans, 2010  USDA's MyPlate  ASA Prophylaxis  Lung CA Screening    DAMIAN Puente [facility]  "

## 2019-07-01 LAB
C TRACH DNA SPEC QL NAA+PROBE: NEGATIVE
LAB SCANNED RESULT: NORMAL
SPECIMEN SOURCE: NORMAL

## 2020-06-15 DIAGNOSIS — N92.0 EXCESSIVE OR FREQUENT MENSTRUATION: ICD-10-CM

## 2020-06-16 RX ORDER — LEVONORGESTREL AND ETHINYL ESTRADIOL 0.15-0.03
KIT ORAL
Qty: 30 TABLET | Refills: 0 | Status: SHIPPED | OUTPATIENT
Start: 2020-06-16 | End: 2020-07-08

## 2020-06-16 NOTE — TELEPHONE ENCOUNTER
Medication is being filled for 1 time refill only due to:  Patient needs to be seen because needs appt.  Please call to schedule.   Linda Daily RN, BSN

## 2020-07-08 ENCOUNTER — OFFICE VISIT (OUTPATIENT)
Dept: FAMILY MEDICINE | Facility: CLINIC | Age: 20
End: 2020-07-08
Payer: COMMERCIAL

## 2020-07-08 VITALS
HEART RATE: 54 BPM | BODY MASS INDEX: 24.78 KG/M2 | TEMPERATURE: 99.3 F | DIASTOLIC BLOOD PRESSURE: 78 MMHG | HEIGHT: 71 IN | WEIGHT: 177 LBS | SYSTOLIC BLOOD PRESSURE: 114 MMHG

## 2020-07-08 DIAGNOSIS — Z00.00 ROUTINE GENERAL MEDICAL EXAMINATION AT A HEALTH CARE FACILITY: Primary | ICD-10-CM

## 2020-07-08 DIAGNOSIS — Z11.3 SCREEN FOR STD (SEXUALLY TRANSMITTED DISEASE): ICD-10-CM

## 2020-07-08 DIAGNOSIS — Z23 ENCOUNTER FOR IMMUNIZATION: ICD-10-CM

## 2020-07-08 DIAGNOSIS — N92.0 EXCESSIVE OR FREQUENT MENSTRUATION: ICD-10-CM

## 2020-07-08 PROCEDURE — G0145 SCR C/V CYTO,THINLAYER,RESCR: HCPCS | Performed by: FAMILY MEDICINE

## 2020-07-08 PROCEDURE — 90715 TDAP VACCINE 7 YRS/> IM: CPT | Performed by: FAMILY MEDICINE

## 2020-07-08 PROCEDURE — 87591 N.GONORRHOEAE DNA AMP PROB: CPT | Performed by: FAMILY MEDICINE

## 2020-07-08 PROCEDURE — 87491 CHLMYD TRACH DNA AMP PROBE: CPT | Performed by: FAMILY MEDICINE

## 2020-07-08 PROCEDURE — 99395 PREV VISIT EST AGE 18-39: CPT | Mod: 25 | Performed by: FAMILY MEDICINE

## 2020-07-08 PROCEDURE — 90471 IMMUNIZATION ADMIN: CPT | Performed by: FAMILY MEDICINE

## 2020-07-08 RX ORDER — LEVONORGESTREL AND ETHINYL ESTRADIOL 0.15-0.03
1 KIT ORAL DAILY
Qty: 84 TABLET | Refills: 3 | Status: SHIPPED | OUTPATIENT
Start: 2020-07-08 | End: 2021-07-28

## 2020-07-08 ASSESSMENT — ENCOUNTER SYMPTOMS
EYE PAIN: 0
NERVOUS/ANXIOUS: 0
ABDOMINAL PAIN: 0
CONSTIPATION: 0
COUGH: 0
FEVER: 0
DIARRHEA: 0
DIZZINESS: 0
HEMATOCHEZIA: 0
CHILLS: 0
HEMATURIA: 0
FREQUENCY: 0

## 2020-07-08 ASSESSMENT — MIFFLIN-ST. JEOR: SCORE: 1656.06

## 2020-07-08 NOTE — PROGRESS NOTES
SUBJECTIVE:   CC: Tayler Andrew is an 19 year old woman who presents for preventive health visit.     Healthy Habits:     Getting at least 3 servings of Calcium per day:  Yes    Bi-annual eye exam:  Yes    Dental care twice a year:  Yes    Sleep apnea or symptoms of sleep apnea:  None    Diet:  Regular (no restrictions)    Frequency of exercise:  6-7 days/week    Duration of exercise:  45-60 minutes    Taking medications regularly:  Yes    Medication side effects:  Not applicable    PHQ-2 Total Score: 0    Additional concerns today:  No    Birth control refill     Social- will be starting senior year at "[employer] major. Continues to play basketball although season may be augmented due to COVID.     Today's PHQ-2 Score:   PHQ-2 ( 1999 Pfizer) 7/8/2020   Q1: Little interest or pleasure in doing things 0   Q2: Feeling down, depressed or hopeless 0   PHQ-2 Score 0   Q1: Little interest or pleasure in doing things Not at all   Q2: Feeling down, depressed or hopeless Not at all   PHQ-2 Score 0       Abuse: Current or Past(Physical, Sexual or Emotional)- No  Do you feel safe in your environment? Yes        Social History     Tobacco Use     Smoking status: Never Smoker     Smokeless tobacco: Never Used   Substance Use Topics     Alcohol use: Yes     Alcohol/week: 0.0 standard drinks     If you drink alcohol do you typically have >3 drinks per day or >7 drinks per week? No    Alcohol Use 7/8/2020   Prescreen: >3 drinks/day or >7 drinks/week? No   Prescreen: >3 drinks/day or >7 drinks/week? -       Reviewed orders with patient.  Reviewed health maintenance and updated orders accordingly - Yes  Labs reviewed in EPIC    Mammogram not appropriate for this patient based on age.    Pertinent mammograms are reviewed under the imaging tab.  History of abnormal Pap smear: NO - age 21-29 PAP every 3 years recommended     Reviewed and updated as needed this visit by clinical staff  Tobacco  Allergies   "Meds  Med Hx  Surg Hx  Fam Hx  Soc Hx        Reviewed and updated as needed this visit by Provider  Meds            Review of Systems   Constitutional: Negative for chills and fever.   HENT: Negative for congestion and ear pain.    Eyes: Negative for pain.   Respiratory: Negative for cough.    Cardiovascular: Negative for chest pain.   Gastrointestinal: Negative for abdominal pain, constipation, diarrhea and hematochezia.   Genitourinary: Negative for frequency and hematuria.   Neurological: Negative for dizziness.   Psychiatric/Behavioral: The patient is not nervous/anxious.           OBJECTIVE:   /78   Pulse 54   Temp 99.3  F (37.4  C) (Oral)   Ht 1.791 m (5' 10.5\")   Wt 80.3 kg (177 lb)   BMI 25.04 kg/m    Physical Exam  GENERAL: healthy, alert and no distress  EYES: Eyes grossly normal to inspection, PERRL and conjunctivae and sclerae normal  HENT: ear canals and TM's normal, nose and mouth without ulcers or lesions  NECK: no adenopathy, no asymmetry, masses, or scars and thyroid normal to palpation  RESP: lungs clear to auscultation - no rales, rhonchi or wheezes  BREAST: normal without masses, tenderness or nipple discharge and no palpable axillary masses or adenopathy  CV: regular rate and rhythm, normal S1 S2, no S3 or S4, no murmur, click or rub, no peripheral edema and peripheral pulses strong  ABDOMEN: soft, nontender,  and bowel sounds normal   (female): normal female external genitalia, normal urethral meatus, vaginal mucosa pink, moist, well rugated, and normal cervix/adnexa/uterus without masses or discharge  MS: no gross musculoskeletal defects noted, no edema  SKIN: no suspicious lesions or rashes  NEURO: Normal strength and tone, mentation intact and speech normal  PSYCH: mentation appears normal, affect normal/bright    Diagnostic Test Results:  Labs reviewed in Epic  none     ASSESSMENT/PLAN:   1. Routine general medical examination at a health care facility  - pap done today. " "If negative will repeat in 3 years   - Pap imaged thin layer screen only - recommended age 21 - 24 years    2. Excessive or frequent menstruation  - stable. Continue with seasonale   - levonorgestrel-ethinyl estradiol (SEASONALE) 0.15-0.03 MG tablet; Take 1 tablet by mouth daily  Dispense: 84 tablet; Refill: 3    3. Screen for STD (sexually transmitted disease)  - NEISSERIA GONORRHOEA PCR  - CHLAMYDIA TRACHOMATIS PCR    4. Encounter for immunization  - TDAP, IM (10 - 64 YRS) - Adacel  - ADMIN 1st VACCINE    COUNSELING:  Reviewed preventive health counseling, as reflected in patient instructions       Regular exercise       Healthy diet/nutrition    Estimated body mass index is 25.04 kg/m  as calculated from the following:    Height as of this encounter: 1.791 m (5' 10.5\").    Weight as of this encounter: 80.3 kg (177 lb).    Weight management plan: Discussed healthy diet and exercise guidelines     reports that she has never smoked. She has never used smokeless tobacco.      Counseling Resources:  ATP IV Guidelines  Pooled Cohorts Equation Calculator  Breast Cancer Risk Calculator  FRAX Risk Assessment  ICSI Preventive Guidelines  Dietary Guidelines for Americans, 2010  USDA's MyPlate  ASA Prophylaxis  Lung CA Screening    Sherry Wilburn MD  [facility]  "

## 2020-07-09 LAB
C TRACH DNA SPEC QL NAA+PROBE: NEGATIVE
N GONORRHOEA DNA SPEC QL NAA+PROBE: NEGATIVE
SPECIMEN SOURCE: NORMAL
SPECIMEN SOURCE: NORMAL

## 2020-07-10 LAB
COPATH REPORT: NORMAL
PAP: NORMAL

## 2020-08-19 ENCOUNTER — NURSE TRIAGE (OUTPATIENT)
Dept: NURSING | Facility: CLINIC | Age: 20
End: 2020-08-19

## 2020-08-19 ENCOUNTER — TELEPHONE (OUTPATIENT)
Dept: FAMILY MEDICINE | Facility: CLINIC | Age: 20
End: 2020-08-19

## 2020-08-19 NOTE — TELEPHONE ENCOUNTER
Was agound someone 7/15 who tested positive for covid-19/no symptoms/ seeking testing for covid 19.  Kenny Spear RN -[phone]

## 2020-08-19 NOTE — TELEPHONE ENCOUNTER
Called pt, discussed, will initiate [email or web address] and follow their recommendations  Eliana Fernandez RN, BSN  Message handled by CLINIC NURSE.

## 2020-08-19 NOTE — TELEPHONE ENCOUNTER
Clinic Action Needed:please call her  Reason for Call:was around someone on 8/15/ who tested positive for covid19 /no symptoms/ seeking covid tresting  Patient Recommendations/Teaching:  Routed to:ALPHONSO

## 2020-10-27 NOTE — PATIENT INSTRUCTIONS
Preventive Health Recommendations  Female Ages 21 to 25     Yearly exam:     See your health care provider every year in order to  o Review health changes.   o Discuss preventive care.    o Review your medicines if your doctor has prescribed any.      You should be tested each year for STDs (sexually transmitted diseases).       Talk to your provider about how often you should have cholesterol testing.      Get a Pap test every three years. If you have an abnormal result, your doctor may have you test more often.      If you are at risk for diabetes, you should have a diabetes test (fasting glucose).     Shots:     Get a flu shot each year.     Get a tetanus shot every 10 years.     Consider getting the shot (vaccine) that prevents cervical cancer (Gardasil).    Nutrition:     Eat at least 5 servings of fruits and vegetables each day.    Eat whole-grain bread, whole-wheat pasta and brown rice instead of white grains and rice.    Get adequate Calcium and Vitamin D.     Lifestyle    Exercise at least 150 minutes a week each week (30 minutes a day, 5 days a week). This will help you control your weight and prevent disease.    Limit alcohol to one drink per day.    No smoking.     Wear sunscreen to prevent skin cancer.    See your dentist every six months for an exam and cleaning.   59

## 2021-06-14 ENCOUNTER — OFFICE VISIT (OUTPATIENT)
Dept: URGENT CARE | Facility: URGENT CARE | Age: 21
End: 2021-06-14
Payer: COMMERCIAL

## 2021-06-14 VITALS
TEMPERATURE: 97.9 F | HEART RATE: 56 BPM | DIASTOLIC BLOOD PRESSURE: 78 MMHG | RESPIRATION RATE: 16 BRPM | OXYGEN SATURATION: 100 % | SYSTOLIC BLOOD PRESSURE: 124 MMHG

## 2021-06-14 DIAGNOSIS — H65.91 RIGHT NON-SUPPURATIVE OTITIS MEDIA: Primary | ICD-10-CM

## 2021-06-14 PROCEDURE — 99213 OFFICE O/P EST LOW 20 MIN: CPT | Performed by: PHYSICIAN ASSISTANT

## 2021-06-14 RX ORDER — AMOXICILLIN 500 MG/1
1000 CAPSULE ORAL 2 TIMES DAILY
Qty: 28 CAPSULE | Refills: 0 | Status: SHIPPED | OUTPATIENT
Start: 2021-06-14 | End: 2021-06-21

## 2021-06-14 NOTE — PROGRESS NOTES
URGENT CARE VISIT:    SUBJECTIVE:   Tayler Andrew is a 20 year old female presenting with a chief complaint of ear pain right.  Onset was 1 day(s) ago.   Associated symptoms include nasal congestion.   She denies the following symptoms: fever, chills, cough - non-productive, vomiting and diarrhea  Course of illness is worsening.    Treatment measures tried include None tried with no relief of symptoms.  Predisposing factors include seasonal allergies.    PMH: History reviewed. No pertinent past medical history.  Allergies: Nuts and Seasonal allergies   Medications:   Current Outpatient Medications   Medication Sig Dispense Refill     amoxicillin (AMOXIL) 500 MG capsule Take 2 capsules (1,000 mg) by mouth 2 times daily for 7 days 28 capsule 0     levonorgestrel-ethinyl estradiol (SEASONALE) 0.15-0.03 MG tablet Take 1 tablet by mouth daily 84 tablet 3     loratadine (CLARITIN) 10 MG tablet Take 10 mg by mouth daily       Social History:   Social History     Tobacco Use     Smoking status: Never Smoker     Smokeless tobacco: Never Used   Substance Use Topics     Alcohol use: Yes     Alcohol/week: 0.0 standard drinks       ROS:  Review of systems negative except as stated above.    OBJECTIVE:  /78   Pulse 56   Temp 97.9  F (36.6  C) (Tympanic)   Resp 16   LMP 04/14/2021   SpO2 100%   Breastfeeding No   GENERAL APPEARANCE: healthy, alert and no distress  EYES: EOMI,  PERRL, conjunctiva clear  HENT: ear canals and left TM normal. Right TM is erythematous and dull.  Nose and mouth without ulcers, erythema or lesions  NECK: supple, nontender, no lymphadenopathy  RESP: lungs clear to auscultation - no rales, rhonchi or wheezes  CV: regular rates and rhythm, normal S1 S2, no murmur noted  SKIN: no suspicious lesions or rashes    ASSESSMENT:    ICD-10-CM    1. Right non-suppurative otitis media  H65.91 amoxicillin (AMOXIL) 500 MG capsule       PLAN:  Patient Instructions   Patient was educated on the natural  course of condition. Take medication as prescribed. Conservative measures discussed including warm compresses and over-the-counter analgesics (Tylenol and Ibuprofen). See your primary care provider if symptoms worsen or do not improve in 7 days. Seek emergency care if you develop severe ear pain, swelling, or redness.    Patient verbalized understanding and is agreeable to plan. The patient was discharged ambulatory and in stable condition.    Lisa Milligan PA-C ....................  6/14/2021   1:43 PM

## 2021-06-14 NOTE — PATIENT INSTRUCTIONS
Patient was educated on the natural course of condition. Take medication as prescribed. Conservative measures discussed including warm compresses and over-the-counter analgesics (Tylenol and Ibuprofen). Continue using antihistamine for allergies. Consider adding a steroid nasal spray. See your primary care provider if symptoms worsen or do not improve in 7 days. Seek emergency care if you develop severe ear pain, swelling, or redness.

## 2021-07-27 DIAGNOSIS — N92.0 EXCESSIVE OR FREQUENT MENSTRUATION: ICD-10-CM

## 2021-07-28 ENCOUNTER — MYC MEDICAL ADVICE (OUTPATIENT)
Dept: FAMILY MEDICINE | Facility: CLINIC | Age: 21
End: 2021-07-28

## 2021-07-28 DIAGNOSIS — N92.0 EXCESSIVE OR FREQUENT MENSTRUATION: ICD-10-CM

## 2021-07-28 RX ORDER — LEVONORGESTREL AND ETHINYL ESTRADIOL 0.15-0.03
KIT ORAL
Qty: 91 TABLET | Refills: 0 | Status: SHIPPED | OUTPATIENT
Start: 2021-07-28 | End: 2021-11-01

## 2021-08-12 NOTE — TELEPHONE ENCOUNTER
Patient's mother called and needed a prescription of Patient's SETLAKIN sent to the [facility] in [place].     Lorenzo Wellington, [facility]  [phone]  August 12, 2021, 4:34 PM

## 2021-08-13 RX ORDER — LEVONORGESTREL AND ETHINYL ESTRADIOL 0.15-0.03
1 KIT ORAL DAILY
Qty: 91 TABLET | Refills: 0 | OUTPATIENT
Start: 2021-08-13

## 2021-08-13 NOTE — TELEPHONE ENCOUNTER
Called Sarah - confirmed patient had not picked up medication. Pharmacy states transferred prescription to [place] on 8/12/2021. Called [facility] in [place], confirmed that the patient picked up prescription. Patient due for visit before further refills can be given.     Maura Man, DAVIDN, RN  [facility]

## 2021-11-01 DIAGNOSIS — T78.40XA ALLERGIC REACTION: Primary | ICD-10-CM

## 2021-11-01 RX ORDER — EPINEPHRINE 0.3 MG/.3ML
0.3 INJECTION SUBCUTANEOUS ONCE
Qty: 0.3 ML | Status: CANCELLED | OUTPATIENT
Start: 2021-11-01 | End: 2021-11-01

## 2021-11-01 NOTE — TELEPHONE ENCOUNTER
Mom calls to request an epinephrine-pen on behalf of patient. Patient is in her 5th year at [employer] and plays basketball. The new  wants patient to have epipen available due to patient's multiple allergies to food, grass, mold, and more.   Patient has not been seen by Primary Care Provider in over one year and epinephrine is not listed as a historical medication.  Patient will submit a EVisit.  Would like prescription sent to [facility]'s pharmacy in [place].  Betsy Johnson RN

## 2021-11-03 ENCOUNTER — E-VISIT (OUTPATIENT)
Dept: FAMILY MEDICINE | Facility: CLINIC | Age: 21
End: 2021-11-03
Payer: COMMERCIAL

## 2021-11-03 DIAGNOSIS — T78.01XS ALLERGY WITH ANAPHYLAXIS DUE TO PEANUTS, SEQUELA: Primary | ICD-10-CM

## 2021-11-03 PROCEDURE — 99421 OL DIG E/M SVC 5-10 MIN: CPT | Performed by: FAMILY MEDICINE

## 2021-11-04 RX ORDER — EPINEPHRINE 0.3 MG/.3ML
0.3 INJECTION SUBCUTANEOUS ONCE
Qty: 0.3 ML | Refills: 1 | Status: SHIPPED | OUTPATIENT
Start: 2021-11-04 | End: 2021-11-04

## 2021-11-04 NOTE — TELEPHONE ENCOUNTER
Provider E-Visit time total (minutes): 5 minutes    Sherry Palmer MD, [facility]

## 2022-01-31 DIAGNOSIS — N92.0 EXCESSIVE OR FREQUENT MENSTRUATION: ICD-10-CM

## 2022-02-01 RX ORDER — LEVONORGESTREL AND ETHINYL ESTRADIOL 0.15-0.03
KIT ORAL
Qty: 91 TABLET | Refills: 0 | Status: SHIPPED | OUTPATIENT
Start: 2022-02-01 | End: 2022-05-02

## 2022-02-01 NOTE — TELEPHONE ENCOUNTER
Prescription approved per [facility] Refill Protocol.  Linda GOTTLIEB RN, BSN

## 2022-05-01 DIAGNOSIS — N92.0 EXCESSIVE OR FREQUENT MENSTRUATION: ICD-10-CM

## 2022-05-02 RX ORDER — LEVONORGESTREL AND ETHINYL ESTRADIOL 0.15-0.03
KIT ORAL
Qty: 91 TABLET | Refills: 0 | Status: SHIPPED | OUTPATIENT
Start: 2022-05-02 | End: 2022-08-04

## 2022-05-02 NOTE — TELEPHONE ENCOUNTER
Prescription approved per [facility] Refill Protocol.  Dona Bennett, RN, BSN, PHN  [facility]

## 2022-05-09 ENCOUNTER — OFFICE VISIT (OUTPATIENT)
Dept: URGENT CARE | Facility: URGENT CARE | Age: 22
End: 2022-05-09
Payer: COMMERCIAL

## 2022-05-09 VITALS
DIASTOLIC BLOOD PRESSURE: 74 MMHG | BODY MASS INDEX: 25.04 KG/M2 | HEART RATE: 93 BPM | RESPIRATION RATE: 16 BRPM | OXYGEN SATURATION: 100 % | SYSTOLIC BLOOD PRESSURE: 112 MMHG | TEMPERATURE: 98.1 F | WEIGHT: 177 LBS

## 2022-05-09 DIAGNOSIS — L20.82 FLEXURAL ECZEMA: Primary | ICD-10-CM

## 2022-05-09 PROCEDURE — 99213 OFFICE O/P EST LOW 20 MIN: CPT | Performed by: PHYSICIAN ASSISTANT

## 2022-05-09 RX ORDER — TRIAMCINOLONE ACETONIDE 1 MG/G
CREAM TOPICAL 2 TIMES DAILY
Qty: 30 G | Refills: 0 | Status: SHIPPED | OUTPATIENT
Start: 2022-05-09 | End: 2022-05-23

## 2022-05-09 NOTE — PATIENT INSTRUCTIONS
Patient was educated on the natural course of rash.  Apply medication as prescribed. Conservative measures discussed including applying Aquaphor and over-the-counter antihistamines (Benadryl or Cetirizine). See your primary care provider if symptoms worsen or do not improve in 7 days. Seek emergency care if you develop severe pain/redness, shortness of breath, or difficulty swallowing.

## 2022-05-09 NOTE — PROGRESS NOTES
URGENT CARE VISIT:    SUBJECTIVE:   HPI:   Tayler Andrew is a 21 year old who presents with rash located over elbow areas since today. Rash is sudden onset and rash seems to be improving. She describes rash as itching. Patient denies difficulty breathing or throat/tongue swelling. Patient has tried nothing with good relief of symptoms. Patient has not had contact exposures to new laundry detergents, soaps, lotions, or other potential irritants. Denies new foods or medications.  Patient denies previous history of a similar rash. No one around them has had a similar rash.     She also has eczema flare up on elbow creases for a week. She has not tried anything for it. Symptoms are stable. She uses oatmeal based cream for skin.    PMH: History reviewed. No pertinent past medical history.  Allergies: Nuts and Seasonal allergies   Medications:   Current Outpatient Medications   Medication Sig Dispense Refill     loratadine (CLARITIN) 10 MG tablet Take 10 mg by mouth daily       SETLAKIN 0.15-0.03 MG tablet TAKE ONE TABLET BY MOUTH ONCE DAILY 91 tablet 0     triamcinolone (KENALOG) 0.1 % external cream Apply topically 2 times daily for 14 days 30 g 0     Social History:   Social History     Socioeconomic History     Marital status: Single     Spouse name: Not on file     Number of children: 0     Years of education: Not on file     Highest education level: Not on file   Occupational History     Comment: [employer]   Tobacco Use     Smoking status: Never Smoker     Smokeless tobacco: Never Used   Substance and Sexual Activity     Alcohol use: Yes     Alcohol/week: 0.0 standard drinks     Drug use: No     Sexual activity: Never     Birth control/protection: Pill   Other Topics Concern     Parent/sibling w/ CABG, MI or angioplasty before 65F 55M? Not Asked   Social History Narrative     Not on file     Social Determinants of Health     Financial Resource Strain: Not on file   Food Insecurity: Not on file    Transportation Needs: Not on file   Physical Activity: Not on file   Stress: Not on file   Social Connections: Not on file   Intimate Partner Violence: Not on file   Housing Stability: Not on file       ROS: ROS otherwise found to be negative except as noted above.    OBJECTIVE:  /74 (BP Location: Right arm, Patient Position: Chair, Cuff Size: Adult Regular)   Pulse 93   Temp 98.1  F (36.7  C) (Oral)   Resp 16   Wt 80.3 kg (177 lb)   LMP 03/09/2022 (Approximate)   SpO2 100%   Breastfeeding No   BMI 25.04 kg/m    General: WDWN in NAD.   Eyes: Non-injected conjunctivas without drainage bilaterally.  Cardiac: RRR without murmurs, rubs, or gallops.  Respiratory: LCTAB without adventitious sounds. Non-labored breathing.  Integumentary:   Distribution: localized  Location: elbow crease    Color: red,  Lesion type: macular, confluent with dryness  Neuro: Alert and oriented.     ASSESSMENT:     ICD-10-CM    1. Flexural eczema  L20.82 triamcinolone (KENALOG) 0.1 % external cream        PLAN:  Patient Instructions   Patient was educated on the natural course of eczema. Apply medication as prescribed. Conservative measures discussed including applying Aquaphor and over-the-counter antihistamines (Benadryl or Cetirizine). There was another rash in upper outer arms that patient showed me on phone that has since disappeared. See your primary care provider if symptoms worsen or do not improve in 7 days. Seek emergency care if you develop severe pain/redness, shortness of breath, or difficulty swallowing.     Patient verbalized understanding and is agreeable to plan. The patient was discharged ambulatory and in stable condition.    Lisa Milligan PA-C on 5/9/2022 at 6:14 PM

## 2022-11-29 DIAGNOSIS — N92.0 EXCESSIVE OR FREQUENT MENSTRUATION: ICD-10-CM

## 2022-11-29 RX ORDER — LEVONORGESTREL AND ETHINYL ESTRADIOL 0.15-0.03
KIT ORAL
Qty: 91 TABLET | Refills: 0 | OUTPATIENT
Start: 2022-11-29

## 2022-11-29 NOTE — TELEPHONE ENCOUNTER
Routing refill request to provider for review/approval because:  Kirstin given x1 and patient did not follow up, please advise  A break in medication  levonorgestrel-ethinyl estradiol (SEASONALE) 0.15-0.03 MG tablet 21 tablet 0 8/4/2022     Dora Dickson RN

## 2022-12-01 RX ORDER — LEVONORGESTREL AND ETHINYL ESTRADIOL 0.15-0.03
1 KIT ORAL DAILY
Qty: 21 TABLET | Refills: 0 | Status: SHIPPED | OUTPATIENT
Start: 2022-12-01 | End: 2023-03-18

## 2023-03-18 ENCOUNTER — TELEPHONE (OUTPATIENT)
Dept: NURSING | Facility: CLINIC | Age: 23
End: 2023-03-18
Payer: COMMERCIAL

## 2023-03-18 DIAGNOSIS — N92.0 EXCESSIVE OR FREQUENT MENSTRUATION: ICD-10-CM

## 2023-03-18 NOTE — TELEPHONE ENCOUNTER
Nurse Triage SBAR    Situation:   Medication request    Background:   -Mom calling, It is okay to leave a detailed message at this number.   -patient is not with the caller  -no consent to communicate on file    Assessment:   -will be out of her birth control pills tomorrow  -FNA unable to give mom any information, but will submit it as a refill request  -patient missed her past appointment and has rescheduled 4/28/23      Recommendation:   Care team: can you refill enough to bridge her to her next appointment     LIO KAY RN on 3/18/2023 at 12:11 PM

## 2023-03-18 NOTE — TELEPHONE ENCOUNTER
"Routing refill request to provider for review/approval because:  Patient needs to be seen because it has been more than 1 year since last office visit.    Last Written Prescription Date:  12/1/22  Last Fill Quantity: 21,  # refills: 0   Last office visit provider:  7/8/20     Requested Prescriptions   Pending Prescriptions Disp Refills     levonorgestrel-ethinyl estradiol (SEASONALE) 0.15-0.03 MG tablet 21 tablet 2     Sig: Take 1 tablet by mouth daily       Contraceptives Protocol Failed - 3/18/2023 12:12 PM        Failed - Recent (12 mo) or future (30 days) visit within the authorizing provider's specialty     Patient has had an office visit with the authorizing provider or a provider within the authorizing providers department within the previous 12 mos or has a future within next 30 days. See \"Patient Info\" tab in inbasket, or \"Choose Columns\" in Meds & Orders section of the refill encounter.              Passed - Patient is not a current smoker if age is 35 or older        Passed - Medication is active on med list        Passed - No active pregnancy on record        Passed - No positive pregnancy test in past 12 months             LIO KAY RN 03/18/23 12:12 PM  "

## 2023-03-22 RX ORDER — LEVONORGESTREL AND ETHINYL ESTRADIOL 0.15-0.03
1 KIT ORAL DAILY
Qty: 21 TABLET | Refills: 2 | Status: SHIPPED | OUTPATIENT
Start: 2023-03-22 | End: 2023-04-28

## 2023-03-22 NOTE — TELEPHONE ENCOUNTER
Rx to [facility]  4/28 appt with Jamshid Kapoor must be rescheduled with any provider accepting new patients  Darrius Smith MD

## 2023-04-28 ENCOUNTER — OFFICE VISIT (OUTPATIENT)
Dept: FAMILY MEDICINE | Facility: CLINIC | Age: 23
End: 2023-04-28
Payer: COMMERCIAL

## 2023-04-28 VITALS
DIASTOLIC BLOOD PRESSURE: 72 MMHG | HEART RATE: 61 BPM | HEIGHT: 70 IN | BODY MASS INDEX: 27.23 KG/M2 | WEIGHT: 190.2 LBS | TEMPERATURE: 98.1 F | SYSTOLIC BLOOD PRESSURE: 110 MMHG | RESPIRATION RATE: 18 BRPM | OXYGEN SATURATION: 99 %

## 2023-04-28 DIAGNOSIS — Z00.00 ROUTINE GENERAL MEDICAL EXAMINATION AT A HEALTH CARE FACILITY: Primary | ICD-10-CM

## 2023-04-28 DIAGNOSIS — R61 SWEATING INCREASE: ICD-10-CM

## 2023-04-28 DIAGNOSIS — N92.0 EXCESSIVE OR FREQUENT MENSTRUATION: ICD-10-CM

## 2023-04-28 DIAGNOSIS — Z11.3 SCREENING FOR STDS (SEXUALLY TRANSMITTED DISEASES): ICD-10-CM

## 2023-04-28 LAB
BASOPHILS # BLD AUTO: 0 10E3/UL (ref 0–0.2)
BASOPHILS NFR BLD AUTO: 0 %
EOSINOPHIL # BLD AUTO: 0 10E3/UL (ref 0–0.7)
EOSINOPHIL NFR BLD AUTO: 1 %
ERYTHROCYTE [DISTWIDTH] IN BLOOD BY AUTOMATED COUNT: 13.1 % (ref 10–15)
HCT VFR BLD AUTO: 40.6 % (ref 35–47)
HGB BLD-MCNC: 13.5 G/DL (ref 11.7–15.7)
LYMPHOCYTES # BLD AUTO: 2.3 10E3/UL (ref 0.8–5.3)
LYMPHOCYTES NFR BLD AUTO: 37 %
MCH RBC QN AUTO: 29.7 PG (ref 26.5–33)
MCHC RBC AUTO-ENTMCNC: 33.3 G/DL (ref 31.5–36.5)
MCV RBC AUTO: 89 FL (ref 78–100)
MONOCYTES # BLD AUTO: 0.5 10E3/UL (ref 0–1.3)
MONOCYTES NFR BLD AUTO: 7 %
NEUTROPHILS # BLD AUTO: 3.4 10E3/UL (ref 1.6–8.3)
NEUTROPHILS NFR BLD AUTO: 55 %
PLATELET # BLD AUTO: 275 10E3/UL (ref 150–450)
RBC # BLD AUTO: 4.54 10E6/UL (ref 3.8–5.2)
TSH SERPL DL<=0.005 MIU/L-ACNC: 0.88 UIU/ML (ref 0.3–4.2)
WBC # BLD AUTO: 6.3 10E3/UL (ref 4–11)

## 2023-04-28 PROCEDURE — 84443 ASSAY THYROID STIM HORMONE: CPT | Performed by: FAMILY MEDICINE

## 2023-04-28 PROCEDURE — 87491 CHLMYD TRACH DNA AMP PROBE: CPT | Performed by: FAMILY MEDICINE

## 2023-04-28 PROCEDURE — 87591 N.GONORRHOEAE DNA AMP PROB: CPT | Performed by: FAMILY MEDICINE

## 2023-04-28 PROCEDURE — 99395 PREV VISIT EST AGE 18-39: CPT | Performed by: FAMILY MEDICINE

## 2023-04-28 PROCEDURE — 36415 COLL VENOUS BLD VENIPUNCTURE: CPT | Performed by: FAMILY MEDICINE

## 2023-04-28 PROCEDURE — 85025 COMPLETE CBC W/AUTO DIFF WBC: CPT | Performed by: FAMILY MEDICINE

## 2023-04-28 RX ORDER — LEVONORGESTREL AND ETHINYL ESTRADIOL 0.15-0.03
1 KIT ORAL DAILY
Qty: 84 TABLET | Refills: 3 | Status: SHIPPED | OUTPATIENT
Start: 2023-04-28 | End: 2024-04-18

## 2023-04-28 SDOH — ECONOMIC STABILITY: FOOD INSECURITY: WITHIN THE PAST 12 MONTHS, YOU WORRIED THAT YOUR FOOD WOULD RUN OUT BEFORE YOU GOT MONEY TO BUY MORE.: NEVER TRUE

## 2023-04-28 SDOH — ECONOMIC STABILITY: TRANSPORTATION INSECURITY
IN THE PAST 12 MONTHS, HAS LACK OF TRANSPORTATION KEPT YOU FROM MEETINGS, WORK, OR FROM GETTING THINGS NEEDED FOR DAILY LIVING?: NO

## 2023-04-28 SDOH — ECONOMIC STABILITY: INCOME INSECURITY: HOW HARD IS IT FOR YOU TO PAY FOR THE VERY BASICS LIKE FOOD, HOUSING, MEDICAL CARE, AND HEATING?: NOT VERY HARD

## 2023-04-28 SDOH — HEALTH STABILITY: PHYSICAL HEALTH: ON AVERAGE, HOW MANY DAYS PER WEEK DO YOU ENGAGE IN MODERATE TO STRENUOUS EXERCISE (LIKE A BRISK WALK)?: 5 DAYS

## 2023-04-28 SDOH — ECONOMIC STABILITY: FOOD INSECURITY: WITHIN THE PAST 12 MONTHS, THE FOOD YOU BOUGHT JUST DIDN'T LAST AND YOU DIDN'T HAVE MONEY TO GET MORE.: NEVER TRUE

## 2023-04-28 SDOH — ECONOMIC STABILITY: TRANSPORTATION INSECURITY
IN THE PAST 12 MONTHS, HAS THE LACK OF TRANSPORTATION KEPT YOU FROM MEDICAL APPOINTMENTS OR FROM GETTING MEDICATIONS?: NO

## 2023-04-28 SDOH — ECONOMIC STABILITY: INCOME INSECURITY: IN THE LAST 12 MONTHS, WAS THERE A TIME WHEN YOU WERE NOT ABLE TO PAY THE MORTGAGE OR RENT ON TIME?: NO

## 2023-04-28 SDOH — HEALTH STABILITY: PHYSICAL HEALTH: ON AVERAGE, HOW MANY MINUTES DO YOU ENGAGE IN EXERCISE AT THIS LEVEL?: 60 MIN

## 2023-04-28 ASSESSMENT — ENCOUNTER SYMPTOMS
HEARTBURN: 0
FREQUENCY: 0
NAUSEA: 0
COUGH: 0
SHORTNESS OF BREATH: 0
PALPITATIONS: 0
BREAST MASS: 0
HEMATURIA: 0
DIARRHEA: 0
MYALGIAS: 0
CHILLS: 0
DYSURIA: 0
EYE PAIN: 0
JOINT SWELLING: 0
CONSTIPATION: 0
ABDOMINAL PAIN: 0
HEMATOCHEZIA: 0
FEVER: 0
SORE THROAT: 0
DIZZINESS: 0
ARTHRALGIAS: 0
HEADACHES: 0
WEAKNESS: 0
NERVOUS/ANXIOUS: 0
PARESTHESIAS: 0

## 2023-04-28 ASSESSMENT — LIFESTYLE VARIABLES
SKIP TO QUESTIONS 9-10: 0
AUDIT-C TOTAL SCORE: 4
HOW MANY STANDARD DRINKS CONTAINING ALCOHOL DO YOU HAVE ON A TYPICAL DAY: 3 OR 4
HOW OFTEN DO YOU HAVE A DRINK CONTAINING ALCOHOL: 2-4 TIMES A MONTH
HOW OFTEN DO YOU HAVE SIX OR MORE DRINKS ON ONE OCCASION: LESS THAN MONTHLY

## 2023-04-28 ASSESSMENT — SOCIAL DETERMINANTS OF HEALTH (SDOH)
ARE YOU MARRIED, WIDOWED, DIVORCED, SEPARATED, NEVER MARRIED, OR LIVING WITH A PARTNER?: NEVER MARRIED
HOW OFTEN DO YOU ATTEND CHURCH OR RELIGIOUS SERVICES?: 1 TO 4 TIMES PER YEAR
IN A TYPICAL WEEK, HOW MANY TIMES DO YOU TALK ON THE PHONE WITH FAMILY, FRIENDS, OR NEIGHBORS?: THREE TIMES A WEEK
HOW OFTEN DO YOU GET TOGETHER WITH FRIENDS OR RELATIVES?: TWICE A WEEK
DO YOU BELONG TO ANY CLUBS OR ORGANIZATIONS SUCH AS CHURCH GROUPS UNIONS, FRATERNAL OR ATHLETIC GROUPS, OR SCHOOL GROUPS?: YES

## 2023-04-28 NOTE — PROGRESS NOTES
"  Assessment & Plan     Routine general medical examination at a health care facility  - physical exam unremarkable   - TSH with free T4 reflex; Future  - CBC with platelets and differential; Future  - TSH with free T4 reflex  - CBC with platelets and differential      Screening for STDs (sexually transmitted diseases)  - NEISSERIA GONORRHOEA PCR; Future  - CHLAMYDIA TRACHOMATIS PCR; Future  - NEISSERIA GONORRHOEA PCR  - CHLAMYDIA TRACHOMATIS PCR    Cervical cancer screening  - declined pap today.   - Pap Screen only - recommended age 21 - 24 years    Excessive or frequent menstruation  - continue on OCP   - levonorgestrel-ethinyl estradiol (SEASONALE) 0.15-0.03 MG tablet; Take 1 tablet by mouth daily    Sweating increase  - likely related to preworkout use which is over 200 mg of caffeine.              BMI:   Estimated body mass index is 27.29 kg/m  as calculated from the following:    Height as of this encounter: 1.778 m (5' 10\").    Weight as of this encounter: 86.3 kg (190 lb 3.2 oz).   Weight management plan: Discussed healthy diet and exercise guidelines    See Patient Instructions    Sherry Wilburn MD  [facility]    Zoran Lester is a 22 year old, presenting for the following health issues:  Physical and Contraception        4/28/2023     3:12 PM   Additional Questions   Roomed by Karin Andrade     Healthy Habits:     Getting at least 3 servings of Calcium per day:  Yes    Bi-annual eye exam:  Yes    Dental care twice a year:  Yes    Sleep apnea or symptoms of sleep apnea:  Daytime drowsiness    Diet:  Regular (no restrictions)    Frequency of exercise:  4-5 days/week    Duration of exercise:  45-60 minutes    Taking medications regularly:  Yes    Medication side effects:  None    PHQ-2 Total Score: 0    Additional concerns today:  Yes     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   Reports increase in sweating even when cold. She " "does consume preworkout but otherwise no other caffeine.   Would like to get thyroid checked.    Review of Systems   Constitutional: Negative for chills and fever.   HENT: Negative for congestion, ear pain, hearing loss and sore throat.    Eyes: Negative for pain and visual disturbance.   Respiratory: Negative for cough and shortness of breath.    Cardiovascular: Negative for chest pain, palpitations and peripheral edema.   Gastrointestinal: Negative for abdominal pain, constipation, diarrhea, heartburn, hematochezia and nausea.   Breasts:  Negative for tenderness, breast mass and discharge.   Genitourinary: Positive for vaginal discharge. Negative for dysuria, frequency, genital sores, hematuria, pelvic pain, urgency and vaginal bleeding.   Musculoskeletal: Negative for arthralgias, joint swelling and myalgias.   Skin: Negative for rash.   Neurological: Negative for dizziness, weakness, headaches and paresthesias.   Psychiatric/Behavioral: Negative for mood changes. The patient is not nervous/anxious.             Objective    /72   Pulse 61   Temp 98.1  F (36.7  C) (Oral)   Resp 18   Ht 1.778 m (5' 10\")   Wt 86.3 kg (190 lb 3.2 oz)   LMP 03/28/2023 (Approximate)   SpO2 99%   BMI 27.29 kg/m    Body mass index is 27.29 kg/m .  Physical Exam   GENERAL: healthy, alert and no distress  EYES: Eyes grossly normal to inspection, PERRL and conjunctivae and sclerae normal  HENT: ear canals and TM's normal, nose and mouth without ulcers or lesions  NECK: no adenopathy, no asymmetry, masses, or scars and thyroid normal to palpation  RESP: lungs clear to auscultation - no rales, rhonchi or wheezes  CV: regular rate and rhythm, normal S1 S2, no S3 or S4, no murmur, click or rub, no peripheral edema and peripheral pulses strong  ABDOMEN: soft, nontender, no hepatosplenomegaly, no masses and bowel sounds normal  MS: no gross musculoskeletal defects noted, no edema  SKIN: no suspicious lesions or rashes  NEURO: Normal " strength and tone, mentation intact and speech normal  PSYCH: mentation appears normal, affect normal/bright

## 2023-04-29 LAB
C TRACH DNA SPEC QL NAA+PROBE: NEGATIVE
N GONORRHOEA DNA SPEC QL NAA+PROBE: NEGATIVE

## 2023-09-29 ENCOUNTER — OFFICE VISIT (OUTPATIENT)
Dept: OBGYN | Facility: CLINIC | Age: 23
End: 2023-09-29
Payer: COMMERCIAL

## 2023-09-29 VITALS
DIASTOLIC BLOOD PRESSURE: 70 MMHG | HEIGHT: 70 IN | WEIGHT: 190 LBS | SYSTOLIC BLOOD PRESSURE: 120 MMHG | BODY MASS INDEX: 27.2 KG/M2

## 2023-09-29 DIAGNOSIS — B37.31 YEAST INFECTION OF THE VAGINA: Primary | ICD-10-CM

## 2023-09-29 DIAGNOSIS — Z12.4 SCREENING FOR CERVICAL CANCER: Primary | ICD-10-CM

## 2023-09-29 DIAGNOSIS — Z11.3 ROUTINE SCREENING FOR STI (SEXUALLY TRANSMITTED INFECTION): ICD-10-CM

## 2023-09-29 DIAGNOSIS — N93.9 ABNORMAL UTERINE BLEEDING: ICD-10-CM

## 2023-09-29 LAB
C TRACH DNA SPEC QL NAA+PROBE: NEGATIVE
CLUE CELLS: ABNORMAL
N GONORRHOEA DNA SPEC QL NAA+PROBE: NEGATIVE
TRICHOMONAS, WET PREP: ABNORMAL
WBC'S/HIGH POWER FIELD, WET PREP: ABNORMAL
YEAST, WET PREP: PRESENT

## 2023-09-29 PROCEDURE — 87591 N.GONORRHOEAE DNA AMP PROB: CPT | Performed by: OBSTETRICS & GYNECOLOGY

## 2023-09-29 PROCEDURE — 87491 CHLMYD TRACH DNA AMP PROBE: CPT | Performed by: OBSTETRICS & GYNECOLOGY

## 2023-09-29 PROCEDURE — G0145 SCR C/V CYTO,THINLAYER,RESCR: HCPCS | Performed by: OBSTETRICS & GYNECOLOGY

## 2023-09-29 PROCEDURE — 99203 OFFICE O/P NEW LOW 30 MIN: CPT | Performed by: OBSTETRICS & GYNECOLOGY

## 2023-09-29 PROCEDURE — 87210 SMEAR WET MOUNT SALINE/INK: CPT | Performed by: OBSTETRICS & GYNECOLOGY

## 2023-09-29 RX ORDER — FLUCONAZOLE 150 MG/1
150 TABLET ORAL ONCE
Qty: 1 TABLET | Refills: 0 | Status: SHIPPED | OUTPATIENT
Start: 2023-09-29 | End: 2023-09-29

## 2023-09-29 NOTE — PROGRESS NOTES
"  SUBJECTIVE:                                                   CC:  Patient presents with:  Menstrual Problem: Heavy irregular bleeding      HPI:  Tayler Andrew is a 22 year old  who presents with post coital bleeding x2 in the last three months.  Also having some abnormal thickened vaginal discharge.    Using continuous OCP and periods are q3 mo and normal.    No known exposures to STI  Recent TSH normal  Had a CT abd/pelvis earlier this year which did not make any mention of uterine abnormality    Gyn History:  Patient's last menstrual period was 2023 (approximate).       Using oral contraceptives for contraception.    Last 3 Pap and HPV Results:       2020    10:22 AM   PAP / HPV   PAP (Historical) NIL        PMH, PSH, Soc Hx, Fam Hx, Meds, and allergies reviewed in Epic.    OBJECTIVE:     /70 (BP Location: Right arm, Patient Position: Chair, Cuff Size: Adult Large)   Ht 1.778 m (5' 10\")   Wt 86.2 kg (190 lb)   LMP 2023 (Approximate)   Breastfeeding No   BMI 27.26 kg/m      Gen: Healthy appearing female, no acute distress, comfortable  Psych: mentation appears normal and affect bright  : Normal external female genitalia.  No external lesions.   SSE: Speculum exam reveals vaginal epithelium well rugated with chunky whitish discharge. Cervix appears smooth, pink, with no visible lesions. Pap obtained.  Oozing of the transition zone/ectropion after pap smear.  Silver nitrate applied.     Test Results:  Results for orders placed or performed in visit on 23 (from the past 24 hour(s))   Wet preparation    Specimen: Vagina; Swab   Result Value Ref Range    Trichomonas Absent Absent    Yeast Present (A) Absent    Clue Cells Absent Absent    WBCs/high power field 3+ (A) None       ASSESSMENT/PLAN:                                                      1. Screening for cervical cancer  - Pap imaged thin layer screen only - recommended age 21 - 24 years    2. Routine " screening for STI (sexually transmitted infection)  - NEISSERIA GONORRHOEA PCR  - CHLAMYDIA TRACHOMATIS PCR    3. Abnormal uterine bleeding  Nl TSH and pelvic imaging.  Most likely related to yeast infection and cervix inflammation.  Friable cervix also coagulated w silver nitrate today.  If not improved after treatment of yeast infection, will do empiric dose of doxy.    - Wet preparation    Christine Adler MD, MPH  Obstetrics and Gynecology     9

## 2023-09-29 NOTE — NURSING NOTE
"Chief Complaint   Patient presents with    Menstrual Problem     Heavy irregular bleeding       Initial /70 (BP Location: Right arm, Patient Position: Chair, Cuff Size: Adult Large)   Ht 1.778 m (5' 10\")   Wt 86.2 kg (190 lb)   LMP 07/29/2023 (Approximate)   Breastfeeding No   BMI 27.26 kg/m   Estimated body mass index is 27.26 kg/m  as calculated from the following:    Height as of this encounter: 1.778 m (5' 10\").    Weight as of this encounter: 86.2 kg (190 lb).  BP completed using cuff size: large    Questioned patient about current smoking habits.  Pt. has never smoked.      No obstetric history on file.    The following HM Due: pap smear, GC/C    Ness Babcock CMA         "

## 2023-10-03 LAB
BKR LAB AP GYN ADEQUACY: NORMAL
BKR LAB AP GYN INTERPRETATION: NORMAL
BKR LAB AP HPV REFLEX: NO
BKR LAB AP LMP: NORMAL
BKR LAB AP PREVIOUS ABNORMAL: NORMAL
PATH REPORT.COMMENTS IMP SPEC: NORMAL
PATH REPORT.COMMENTS IMP SPEC: NORMAL
PATH REPORT.RELEVANT HX SPEC: NORMAL

## 2024-04-15 ENCOUNTER — MYC REFILL (OUTPATIENT)
Dept: FAMILY MEDICINE | Facility: CLINIC | Age: 24
End: 2024-04-15
Payer: COMMERCIAL

## 2024-04-15 DIAGNOSIS — N92.0 EXCESSIVE OR FREQUENT MENSTRUATION: ICD-10-CM

## 2024-04-16 RX ORDER — LEVONORGESTREL AND ETHINYL ESTRADIOL 0.15-0.03
1 KIT ORAL DAILY
Qty: 84 TABLET | Refills: 3 | OUTPATIENT
Start: 2024-04-16

## 2024-04-16 NOTE — TELEPHONE ENCOUNTER
Epic issue with BCP and cannot refill at this time, provider no longer in [facility], routed to medical director per protocol, pt due for annual visit

## 2024-04-17 ENCOUNTER — MYC MEDICAL ADVICE (OUTPATIENT)
Dept: FAMILY MEDICINE | Facility: CLINIC | Age: 24
End: 2024-04-17
Payer: COMMERCIAL

## 2024-04-18 DIAGNOSIS — Z30.41 ENCOUNTER FOR SURVEILLANCE OF CONTRACEPTIVE PILLS: ICD-10-CM

## 2024-04-18 DIAGNOSIS — N92.0 EXCESSIVE OR FREQUENT MENSTRUATION: ICD-10-CM

## 2024-04-18 RX ORDER — LEVONORGESTREL AND ETHINYL ESTRADIOL 0.15-0.03
1 KIT ORAL DAILY
Qty: 28 TABLET | Refills: 0 | OUTPATIENT
Start: 2024-04-18

## 2024-04-18 RX ORDER — LEVONORGESTREL AND ETHINYL ESTRADIOL 0.15-0.03
1 KIT ORAL DAILY
Qty: 91 TABLET | Refills: 0 | Status: SHIPPED | OUTPATIENT
Start: 2024-04-18 | End: 2024-08-06

## 2024-08-06 ENCOUNTER — OFFICE VISIT (OUTPATIENT)
Dept: FAMILY MEDICINE | Facility: CLINIC | Age: 24
End: 2024-08-06
Payer: COMMERCIAL

## 2024-08-06 VITALS
HEART RATE: 76 BPM | RESPIRATION RATE: 14 BRPM | WEIGHT: 190 LBS | BODY MASS INDEX: 27.2 KG/M2 | HEIGHT: 70 IN | DIASTOLIC BLOOD PRESSURE: 83 MMHG | SYSTOLIC BLOOD PRESSURE: 126 MMHG | OXYGEN SATURATION: 99 % | TEMPERATURE: 99.3 F

## 2024-08-06 DIAGNOSIS — L20.82 FLEXURAL ECZEMA: ICD-10-CM

## 2024-08-06 DIAGNOSIS — N92.0 EXCESSIVE OR FREQUENT MENSTRUATION: ICD-10-CM

## 2024-08-06 DIAGNOSIS — Z00.00 ROUTINE GENERAL MEDICAL EXAMINATION AT A HEALTH CARE FACILITY: Primary | ICD-10-CM

## 2024-08-06 PROCEDURE — 99395 PREV VISIT EST AGE 18-39: CPT | Performed by: FAMILY MEDICINE

## 2024-08-06 RX ORDER — LEVONORGESTREL AND ETHINYL ESTRADIOL 0.15-0.03
1 KIT ORAL DAILY
Qty: 91 TABLET | Refills: 4 | Status: SHIPPED | OUTPATIENT
Start: 2024-08-06

## 2024-08-06 RX ORDER — BETAMETHASONE DIPROPIONATE 0.5 MG/G
CREAM TOPICAL 2 TIMES DAILY
Qty: 45 G | Refills: 2 | Status: SHIPPED | OUTPATIENT
Start: 2024-08-06

## 2024-08-06 SDOH — HEALTH STABILITY: PHYSICAL HEALTH: ON AVERAGE, HOW MANY MINUTES DO YOU ENGAGE IN EXERCISE AT THIS LEVEL?: 40 MIN

## 2024-08-06 SDOH — HEALTH STABILITY: PHYSICAL HEALTH: ON AVERAGE, HOW MANY DAYS PER WEEK DO YOU ENGAGE IN MODERATE TO STRENUOUS EXERCISE (LIKE A BRISK WALK)?: 4 DAYS

## 2024-08-06 ASSESSMENT — SOCIAL DETERMINANTS OF HEALTH (SDOH): HOW OFTEN DO YOU GET TOGETHER WITH FRIENDS OR RELATIVES?: THREE TIMES A WEEK

## 2024-08-06 NOTE — PROGRESS NOTES
"Preventive Care Visit  [facility]  Nava Varela MD, Family Medicine  Aug 6, 2024      Assessment & Plan     Routine general medical examination at a health care facility    Excessive or frequent menstruation - stable on seasonale, refills  - levonorgestrel-ethinyl estradiol (SEASONALE) 0.15-0.03 MG tablet; Take 1 tablet by mouth daily    Flexural eczema - requesting topical to help with forearm eczema - sent. Cautioned no more than 14 days at a time, allow 14 days in between treatments. Advised good moisturization.   - betamethasone dipropionate (DIPROSONE) 0.05 % external cream; Apply topically 2 times daily          BMI  Estimated body mass index is 27.26 kg/m  as calculated from the following:    Height as of this encounter: 1.778 m (5' 10\").    Weight as of this encounter: 86.2 kg (190 lb).     Counseling  Appropriate preventive services were addressed with this patient via screening, questionnaire, or discussion as appropriate for fall prevention, nutrition, physical activity, Tobacco-use cessation, weight loss and cognition.  Checklist reviewing preventive services available has been given to the patient.  Reviewed patient's diet, addressing concerns and/or questions.   She is at risk for psychosocial distress and has been provided with information to reduce risk.       Zoran Lester is a 23 year old, presenting for the following:  Physical        8/6/2024     4:47 PM   Additional Questions   Roomed by Gustavo Ramos   Accompanied by self        Health Care Directive  Patient does not have a Health Care Directive or Living Will: Discussed advance care planning with patient; information given to patient to review.    HPI      8/6/2024   General Health   How would you rate your overall physical health? Good   Feel stress (tense, anxious, or unable to sleep) Only a little      (!) STRESS CONCERN      8/6/2024   Nutrition   Three or more servings of calcium each day? (!) NO "   Diet: Gluten-free/reduced   How many servings of fruit and vegetables per day? (!) 0-1   How many sweetened beverages each day? 0-1            8/6/2024   Exercise   Days per week of moderate/strenous exercise 4 days   Average minutes spent exercising at this level 40 min            8/6/2024   Social Factors   Frequency of gathering with friends or relatives Three times a week   Worry food won't last until get money to buy more No   Food not last or not have enough money for food? No   Do you have housing? (Housing is defined as stable permanent housing and does not include staying ouside in a car, in a tent, in an abandoned building, in an overnight shelter, or couch-surfing.) Yes   Are you worried about losing your housing? No   Lack of transportation? No   Unable to get utilities (heat,electricity)? No            8/6/2024   Dental   Dentist two times every year? Yes            8/6/2024   TB Screening   Were you born outside of the [place]? No            Today's PHQ-2 Score:       8/6/2024    12:45 PM   PHQ-2 ( 1999 [employer])   Q1: Little interest or pleasure in doing things 0   Q2: Feeling down, depressed or hopeless 0   PHQ-2 Score 0   Q1: Little interest or pleasure in doing things Not at all   Q2: Feeling down, depressed or hopeless Not at all   PHQ-2 Score 0           8/6/2024   Substance Use   Alcohol more than 3/day or more than 7/wk No   Do you use any other substances recreationally? No        Social History     Tobacco Use    Smoking status: Never    Smokeless tobacco: Never   Substance Use Topics    Alcohol use: Yes     Alcohol/week: 0.0 standard drinks of alcohol    Drug use: No          Mammogram Screening - Patient under 40 years of age: Routine Mammogram Screening not recommended.           8/6/2024   One time HIV Screening   Previous HIV test? Yes          8/6/2024   STI Screening   New sexual partner(s) since last STI/HIV test? (!) YES - declined STI screening        History of abnormal Pap smear: No -  "age 21-29 PAP every 3 years recommended        9/29/2023     8:45 AM 7/8/2020    10:22 AM   PAP / HPV   PAP Negative for Intraepithelial Lesion or Malignancy (NILM)     PAP (Historical)  NIL            8/6/2024   Contraception/Family Planning   Questions about contraception or family planning (!) YES           Reviewed and updated as needed this visit by Provider                    Patient Active Problem List   Diagnosis    Excessive or frequent menstruation    Flexural eczema     No past surgical history on file.    Social History     Tobacco Use    Smoking status: Never    Smokeless tobacco: Never   Substance Use Topics    Alcohol use: Yes     Alcohol/week: 0.0 standard drinks of alcohol     No family history on file.          Review of Systems  Constitutional, neuro, ENT, endocrine, pulmonary, cardiac, gastrointestinal, genitourinary, musculoskeletal, integument and psychiatric systems are negative, except as otherwise noted.     Objective    Exam  /83 (BP Location: Right arm, Patient Position: Chair, Cuff Size: Adult Large)   Pulse 76   Temp 99.3  F (37.4  C) (Oral)   Resp 14   Ht 1.778 m (5' 10\")   Wt 86.2 kg (190 lb)   SpO2 99%   Breastfeeding No   BMI 27.26 kg/m     Estimated body mass index is 27.26 kg/m  as calculated from the following:    Height as of this encounter: 1.778 m (5' 10\").    Weight as of this encounter: 86.2 kg (190 lb).    Physical Exam  GENERAL: alert and no distress  EYES: Eyes grossly normal to inspection, PERRL and conjunctivae and sclerae normal  HENT: ear canals and TM's normal, nose and mouth without ulcers or lesions  NECK: no adenopathy, no asymmetry, masses, or scars  RESP: lungs clear to auscultation - no rales, rhonchi or wheezes  BREAST: normal without masses, tenderness or nipple discharge and no palpable axillary masses or adenopathy  CV: regular rate and rhythm, normal S1 S2, no S3 or S4, no murmur, click or rub, no peripheral edema  ABDOMEN: soft, nontender, no " hepatosplenomegaly, no masses and bowel sounds normal  MS: no gross musculoskeletal defects noted, no edema  SKIN: no suspicious lesions or rashes  NEURO: Normal strength and tone, mentation intact and speech normal  PSYCH: mentation appears normal, affect normal/bright        Signed Electronically by: Nava Varela MD

## 2024-08-06 NOTE — PATIENT INSTRUCTIONS
Patient Education   Preventive Care Advice   This is general advice given by our system to help you stay healthy. However, your care team may have specific advice just for you. Please talk to your care team about your preventive care needs.  Nutrition  Eat 5 or more servings of fruits and vegetables each day.  Try wheat bread, brown rice and whole grain pasta (instead of white bread, rice, and pasta).  Get enough calcium and vitamin D. Check the label on foods and aim for 100% of the RDA (recommended daily allowance).  Lifestyle  Exercise at least 150 minutes each week  (30 minutes a day, 5 days a week).  Do muscle strengthening activities 2 days a week. These help control your weight and prevent disease.  No smoking.  Wear sunscreen to prevent skin cancer.  Have a dental exam and cleaning every 6 months.  Yearly exams  See your health care team every year to talk about:  Any changes in your health.  Any medicines your care team has prescribed.  Preventive care, family planning, and ways to prevent chronic diseases.  Shots (vaccines)   HPV shots (up to age 26), if you've never had them before.  Hepatitis B shots (up to age 59), if you've never had them before.  COVID-19 shot: Get this shot when it's due.  Flu shot: Get a flu shot every year.  Tetanus shot: Get a tetanus shot every 10 years.  Pneumococcal, hepatitis A, and RSV shots: Ask your care team if you need these based on your risk.  Shingles shot (for age 50 and up)  General health tests  Diabetes screening:  Starting at age 35, Get screened for diabetes at least every 3 years.  If you are younger than age 35, ask your care team if you should be screened for diabetes.  Cholesterol test: At age 39, start having a cholesterol test every 5 years, or more often if advised.  Bone density scan (DEXA): At age 50, ask your care team if you should have this scan for osteoporosis (brittle bones).  Hepatitis C: Get tested at least once in your life.  STIs (sexually  transmitted infections)  Before age 24: Ask your care team if you should be screened for STIs.  After age 24: Get screened for STIs if you're at risk. You are at risk for STIs (including HIV) if:  You are sexually active with more than one person.  You don't use condoms every time.  You or a partner was diagnosed with a sexually transmitted infection.  If you are at risk for HIV, ask about PrEP medicine to prevent HIV.  Get tested for HIV at least once in your life, whether you are at risk for HIV or not.  Cancer screening tests  Cervical cancer screening: If you have a cervix, begin getting regular cervical cancer screening tests starting at age 21.  Breast cancer scan (mammogram): If you've ever had breasts, begin having regular mammograms starting at age 40. This is a scan to check for breast cancer.  Colon cancer screening: It is important to start screening for colon cancer at age 45.  Have a colonoscopy test every 10 years (or more often if you're at risk) Or, ask your provider about stool tests like a FIT test every year or Cologuard test every 3 years.  To learn more about your testing options, visit:   .  For help making a decision, visit:   https://bit.ly/ta03791.  Prostate cancer screening test: If you have a prostate, ask your care team if a prostate cancer screening test (PSA) at age 55 is right for you.  Lung cancer screening: If you are a current or former smoker ages 50 to 80, ask your care team if ongoing lung cancer screenings are right for you.  For informational purposes only. Not to replace the advice of your health care provider. Copyright   2023 [facility]. All rights reserved. Clinically reviewed by the [facility]. [employer] [id number] - REV 01/24.  Safer Sex: Care Instructions  Overview  Safer sex is a way to reduce your risk of getting a sexually transmitted infection (STI). It can also help prevent pregnancy.  Several products can help you practice  safer sex and reduce your chance of STIs. One of the best is a condom. There are internal and external condoms. You can use a special rubber sheet (dental dam) for protection during oral sex. Disposable gloves can keep your hands from touching blood, semen, or other body fluids that can carry infections.  Remember that birth control methods such as diaphragms, IUDs, foams, and birth control pills do not stop you from getting STIs.  Follow-up care is a key part of your treatment and safety. Be sure to make and go to all appointments, and call your doctor if you are having problems. It's also a good idea to know your test results and keep a list of the medicines you take.  How can you care for yourself at home?  Think about getting vaccinated to help prevent hepatitis A, hepatitis B, and human papillomavirus (HPV). They can be spread through sex.  Use a condom every time you have sex. Use an external condom, which goes on the penis. Or use an internal condom, which goes into the vagina or anus.  Make sure you use the right size external condom. A condom that's too small can break easily. A condom that's too big can slip off during sex.  Use a new condom each time you have sex. Be careful not to poke a hole in the condom when you open the wrapper.  Don't use an internal condom and an external condom at the same time.  Never use petroleum jelly (such as Vaseline), grease, hand lotion, baby oil, or anything with oil in it. These products can make holes in the condom.  After intercourse, hold the edge of the condom as you remove it. This will help keep semen from spilling out of the condom.  Do not have sex with anyone who has symptoms of an STI, such as sores on the genitals or mouth.  Do not drink a lot of alcohol or use drugs before sex.  Limit your sex partners. Sex with one partner who has sex only with you can reduce your risk of getting an STI.  Don't share sex toys. But if you do share them, use a condom and clean  "the sex toys between each use.  Talk to any partners before you have sex. Talk about what you feel comfortable with and whether you have any boundaries with sex. And find out if your partner or partners may be at risk for any STI. Keep in mind that a person may be able to spread an STI even if they do not have symptoms. You and any partners may want to get tested for STIs.  Where can you learn more?  Go to https://www.[employer].net/patiented  Enter B608 in the search box to learn more about \"Safer Sex: Care Instructions.\"  Current as of: November 27, 2023               Content Version: 14.0    0749-3526 [employer].   Care instructions adapted under license by your healthcare professional. If you have questions about a medical condition or this instruction, always ask your healthcare professional. [employer] disclaims any warranty or liability for your use of this information.         "

## 2025-07-07 ENCOUNTER — PATIENT OUTREACH (OUTPATIENT)
Dept: CARE COORDINATION | Facility: CLINIC | Age: 25
End: 2025-07-07
Payer: COMMERCIAL

## 2025-08-09 DIAGNOSIS — N92.0 EXCESSIVE OR FREQUENT MENSTRUATION: Primary | ICD-10-CM

## 2025-08-11 RX ORDER — LEVONORGESTREL AND ETHINYL ESTRADIOL 0.15-0.03
1 KIT ORAL DAILY
Qty: 91 TABLET | Refills: 0 | Status: SHIPPED | OUTPATIENT
Start: 2025-08-11